# Patient Record
Sex: FEMALE | Race: WHITE | NOT HISPANIC OR LATINO | Employment: FULL TIME | URBAN - METROPOLITAN AREA
[De-identification: names, ages, dates, MRNs, and addresses within clinical notes are randomized per-mention and may not be internally consistent; named-entity substitution may affect disease eponyms.]

---

## 2023-05-04 LAB
ERYTHROCYTE DISTRIBUTION WIDTH (RATIO) BY AUTOMATED COUNT: 12.2 % (ref 11.5–14.5)
ERYTHROCYTE MEAN CORPUSCULAR HEMOGLOBIN CONCENTRATION (G/DL) BY AUTOMATED: 35.1 G/DL (ref 32–36)
ERYTHROCYTE MEAN CORPUSCULAR VOLUME (FL) BY AUTOMATED COUNT: 89 FL (ref 80–100)
ERYTHROCYTES (10*6/UL) IN BLOOD BY AUTOMATED COUNT: 4.21 X10E12/L (ref 4–5.2)
HEMATOCRIT (%) IN BLOOD BY AUTOMATED COUNT: 37.6 % (ref 36–46)
HEMOGLOBIN (G/DL) IN BLOOD: 13.2 G/DL (ref 12–16)
LEUKOCYTES (10*3/UL) IN BLOOD BY AUTOMATED COUNT: 10.4 X10E9/L (ref 4.4–11.3)
PLATELETS (10*3/UL) IN BLOOD AUTOMATED COUNT: 310 X10E9/L (ref 150–450)
REFLEX ADDED, ANEMIA PANEL: NORMAL

## 2023-05-05 LAB
ABO GROUP (TYPE) IN BLOOD: NORMAL
ANTIBODY SCREEN: NORMAL
HEPATITIS B VIRUS SURFACE AG PRESENCE IN SERUM: NONREACTIVE
HEPATITIS C VIRUS AB PRESENCE IN SERUM: NONREACTIVE
HIV 1/ 2 AG/AB SCREEN: NONREACTIVE
RH FACTOR: NORMAL
SYPHILIS TOTAL AB: NONREACTIVE
URINE CULTURE: NORMAL

## 2023-05-09 LAB
RUBELLA VIRUS IGG AB: POSITIVE
VARICELLA ZOSTER IGG: NEGATIVE

## 2023-05-11 LAB — SMA RESULT: NORMAL

## 2023-05-12 LAB — CF RESULTS: NORMAL

## 2023-06-09 LAB — LAB MOLECULAR CA TECHNICAL NOTES: NORMAL

## 2023-09-14 LAB
ERYTHROCYTE DISTRIBUTION WIDTH (RATIO) BY AUTOMATED COUNT: 12.9 % (ref 11.5–14.5)
ERYTHROCYTE MEAN CORPUSCULAR HEMOGLOBIN CONCENTRATION (G/DL) BY AUTOMATED: 35.7 G/DL (ref 32–36)
ERYTHROCYTE MEAN CORPUSCULAR VOLUME (FL) BY AUTOMATED COUNT: 92 FL (ref 80–100)
ERYTHROCYTES (10*6/UL) IN BLOOD BY AUTOMATED COUNT: 3.67 X10E12/L (ref 4–5.2)
GLUCOSE, 1 HR SCREEN, PREG: 145 MG/DL
HEMATOCRIT (%) IN BLOOD BY AUTOMATED COUNT: 33.6 % (ref 36–46)
HEMOGLOBIN (G/DL) IN BLOOD: 12 G/DL (ref 12–16)
LEUKOCYTES (10*3/UL) IN BLOOD BY AUTOMATED COUNT: 10 X10E9/L (ref 4.4–11.3)
PLATELETS (10*3/UL) IN BLOOD AUTOMATED COUNT: 223 X10E9/L (ref 150–450)
REFLEX ADDED, ANEMIA PANEL: ABNORMAL

## 2023-09-15 LAB
ABO GROUP (TYPE) IN BLOOD: NORMAL
ANTIBODY SCREEN: NORMAL
RH FACTOR: NORMAL

## 2023-09-27 LAB
GLUCOSE THREE HOUR: 92 MG/DL
GLUCOSE TWO HOUR: 123 MG/DL
GLUCOSE, FASTING: 70 MG/DL
GLUCOSE, ONE HOUR: 112 MG/DL
GTTCM: NORMAL

## 2023-09-28 PROBLEM — O21.9 NAUSEA/VOMITING IN PREGNANCY (HHS-HCC): Status: ACTIVE | Noted: 2023-09-28

## 2023-09-28 PROBLEM — R73.09 ELEVATED GLUCOSE TOLERANCE TEST: Status: ACTIVE | Noted: 2023-09-28

## 2023-09-28 PROBLEM — Z3A.28 28 WEEKS GESTATION OF PREGNANCY (HHS-HCC): Status: ACTIVE | Noted: 2023-09-28

## 2023-09-28 PROBLEM — O26.899 RH NEGATIVE STATUS DURING PREGNANCY (HHS-HCC): Status: ACTIVE | Noted: 2023-09-28

## 2023-09-28 PROBLEM — Z34.03 PRIMIPAROUS IN THIRD TRIMESTER (HHS-HCC): Status: ACTIVE | Noted: 2023-09-28

## 2023-09-28 PROBLEM — Z67.91 RH NEGATIVE STATUS DURING PREGNANCY (HHS-HCC): Status: ACTIVE | Noted: 2023-09-28

## 2023-09-28 PROBLEM — O99.810 ABNORMAL GLUCOSE AFFECTING PREGNANCY (HHS-HCC): Status: ACTIVE | Noted: 2023-09-28

## 2023-09-28 RX ORDER — PYRIDOXINE HCL (VITAMIN B6) 25 MG
1 TABLET ORAL 3 TIMES DAILY
COMMUNITY
Start: 2023-06-08 | End: 2023-12-28 | Stop reason: WASHOUT

## 2023-10-09 ENCOUNTER — ROUTINE PRENATAL (OUTPATIENT)
Dept: OBSTETRICS AND GYNECOLOGY | Facility: CLINIC | Age: 23
End: 2023-10-09
Payer: COMMERCIAL

## 2023-10-09 VITALS — BODY MASS INDEX: 31.71 KG/M2 | SYSTOLIC BLOOD PRESSURE: 118 MMHG | WEIGHT: 179 LBS | DIASTOLIC BLOOD PRESSURE: 78 MMHG

## 2023-10-09 DIAGNOSIS — O99.810 ABNORMAL GLUCOSE AFFECTING PREGNANCY (HHS-HCC): ICD-10-CM

## 2023-10-09 DIAGNOSIS — Z3A.30 30 WEEKS GESTATION OF PREGNANCY (HHS-HCC): ICD-10-CM

## 2023-10-09 DIAGNOSIS — Z34.03 PRIMIPAROUS IN THIRD TRIMESTER (HHS-HCC): Primary | ICD-10-CM

## 2023-10-09 DIAGNOSIS — Z67.91 RH NEGATIVE STATUS DURING PREGNANCY IN THIRD TRIMESTER (HHS-HCC): ICD-10-CM

## 2023-10-09 DIAGNOSIS — O26.893 RH NEGATIVE STATUS DURING PREGNANCY IN THIRD TRIMESTER (HHS-HCC): ICD-10-CM

## 2023-10-09 PROCEDURE — 99213 OFFICE O/P EST LOW 20 MIN: CPT | Performed by: NURSE PRACTITIONER

## 2023-10-09 NOTE — PROGRESS NOTES
"Subjective   Patient ID [id number]   Ana Morgan is a 23 y.o.  at 30w6d with a working estimated date of delivery of 2023, by Ultrasound who presents for a routine prenatal visit. She denies vaginal bleeding, leakage of fluid, decreased fetal movements, or contractions.    Her pregnancy is complicated by:  Glucose intolerance--> failed 1 hour-->passed 3 hour GTT  Rh negative--> Rhogam given     Objective   Physical Exam:   Weight: 81.2 kg (179 lb)  Expected Total Weight Gain: 7 kg (15 lb)-11.5 kg (25 lb)   Pregravid BMI: 28.70  BP: 118/78  Fetal Heart Rate: 150 Fundal Height (cm): 31 cm Presentation: Vertex           Prenatal Labs  Urine Dip:  No results found for: \"KETONESU\", \"GLUCOSEUR\", \"LEUKOCYTESUR\"  Lab Results   Component Value Date    HGB 12.0 2023    HCT 33.6 (L) 2023    HEPBSAG NONREACTIVE 2023       Imaging  The most recent ultrasound was performed on   with a study GA of   and EFW of  .          Assessment/Plan   Diagnoses and all orders for this visit:  Primiparous in third trimester  Abnormal glucose affecting pregnancy  Rh negative status during pregnancy in third trimester  30 weeks gestation of pregnancy    Continue prenatal vitamin.  Labs reviewed.  GBS @ 36 weeks   Follow up in 1 week for a routine prenatal visit.    The following were addressed during this visit:    Education Trimester 2  - Breastfeeding Education: Second Trimester   -  Labor     Education Trimester 3  - Breastfeeding Education: Third Trimester   - Warning Signs   - Length of Stay   - Warning Signs/PIH        "

## 2023-10-26 ENCOUNTER — ROUTINE PRENATAL (OUTPATIENT)
Dept: OBSTETRICS AND GYNECOLOGY | Facility: CLINIC | Age: 23
End: 2023-10-26
Payer: COMMERCIAL

## 2023-10-26 VITALS — SYSTOLIC BLOOD PRESSURE: 110 MMHG | WEIGHT: 178 LBS | BODY MASS INDEX: 31.53 KG/M2 | DIASTOLIC BLOOD PRESSURE: 80 MMHG

## 2023-10-26 DIAGNOSIS — Z3A.32 32 WEEKS GESTATION OF PREGNANCY (HHS-HCC): ICD-10-CM

## 2023-10-26 DIAGNOSIS — Z34.03 PRIMIPAROUS IN THIRD TRIMESTER (HHS-HCC): Primary | ICD-10-CM

## 2023-10-26 PROCEDURE — 0501F PRENATAL FLOW SHEET: CPT | Performed by: MIDWIFE

## 2023-10-26 NOTE — PROGRESS NOTES
"Subjective   Patient ID [id number]   Ana Morgan is a 23 y.o.  at 33w2d with a working estimated date of delivery of 2023, by Ultrasound who presents for a routine prenatal visit. She denies vaginal bleeding, leakage of fluid, decreased fetal movements, or contractions. They are working on the nursery and mostly ready for the baby. They just had their shower and have everything needed.     Her pregnancy is complicated by:  -Rh negative status, s/p Rhogam 10/9/23  -elevated 50g GTT, passed 3H testing (3/4 values)     Objective   Physical Exam: NAD, easy respiratory effort, CN grossly intact, alert & oriented @ baseline   Weight: 80.7 kg (178 lb)  Expected Total Weight Gain: 7 kg (15 lb)-11.5 kg (25 lb)   Pregravid BMI: 28.70  BP: 110/80                  Prenatal Labs  Urine Dip:  No results found for: \"KETONESU\", \"GLUCOSEUR\", \"LEUKOCYTESUR\"  Lab Results   Component Value Date    HGB 12.0 2023    HCT 33.6 (L) 2023    ABO O 2023    HEPBSAG NONREACTIVE 2023     No results found for: \"PAPPA\", \"AFP\", \"HCG\", \"ESTRIOL\", \"INHBA\"  No results found for: \"GLUF\", \"GLUT1\", \"IFDJPUB0QE\", \"KITZCSN3GC\"    Imaging  The most recent ultrasound was for anatomy @ 20wga. .          Assessment/Plan   There are no diagnoses linked to this encounter.  Continue prenatal vitamin.  Labs reviewed and up to date.  Anticipate GBS @ 36w.  Expected mode of delivery   Follow up in 2 weeks for a routine prenatal visit.    SOL GRAHAM   "

## 2023-10-27 PROBLEM — O21.9 NAUSEA/VOMITING IN PREGNANCY (HHS-HCC): Status: RESOLVED | Noted: 2023-09-28 | Resolved: 2023-10-27

## 2023-10-27 PROBLEM — Z3A.28 28 WEEKS GESTATION OF PREGNANCY (HHS-HCC): Status: RESOLVED | Noted: 2023-09-28 | Resolved: 2023-10-27

## 2023-10-27 PROBLEM — O99.810 ABNORMAL GLUCOSE AFFECTING PREGNANCY (HHS-HCC): Status: RESOLVED | Noted: 2023-09-28 | Resolved: 2023-10-27

## 2023-11-06 ENCOUNTER — ROUTINE PRENATAL (OUTPATIENT)
Dept: OBSTETRICS AND GYNECOLOGY | Facility: CLINIC | Age: 23
End: 2023-11-06
Payer: COMMERCIAL

## 2023-11-06 VITALS — WEIGHT: 179 LBS | SYSTOLIC BLOOD PRESSURE: 120 MMHG | DIASTOLIC BLOOD PRESSURE: 80 MMHG | BODY MASS INDEX: 31.71 KG/M2

## 2023-11-06 DIAGNOSIS — Z67.91 RH NEGATIVE STATUS DURING PREGNANCY IN THIRD TRIMESTER (HHS-HCC): ICD-10-CM

## 2023-11-06 DIAGNOSIS — R73.09 ELEVATED GLUCOSE TOLERANCE TEST: ICD-10-CM

## 2023-11-06 DIAGNOSIS — Z34.03 PRIMIPAROUS IN THIRD TRIMESTER (HHS-HCC): Primary | ICD-10-CM

## 2023-11-06 DIAGNOSIS — O26.893 RH NEGATIVE STATUS DURING PREGNANCY IN THIRD TRIMESTER (HHS-HCC): ICD-10-CM

## 2023-11-06 DIAGNOSIS — Z3A.34 34 WEEKS GESTATION OF PREGNANCY (HHS-HCC): ICD-10-CM

## 2023-11-06 PROCEDURE — 0501F PRENATAL FLOW SHEET: CPT | Performed by: NURSE PRACTITIONER

## 2023-11-06 NOTE — PROGRESS NOTES
"Subjective   Patient ID [id number]   Ana Morgan is a 23 y.o.  at 34w6d with a working estimated date of delivery of 2023, by Ultrasound who presents for a routine prenatal visit. She denies vaginal bleeding, leakage of fluid, decreased fetal movements, or contractions.    Her pregnancy is complicated by:  Pregnancy c/b:   -Rh negative status, s/p Rhogam 10/9/23   -elevated 50g GTT, passed 3H testing (3/4 values)       Objective   Physical Exam:   Weight: 81.2 kg (179 lb)  Expected Total Weight Gain: 7 kg (15 lb)-11.5 kg (25 lb)   Pregravid BMI: 28.70  BP: 120/80  Fetal Heart Rate: 140 Fundal Height (cm): 34 cm Presentation: Vertex           Prenatal Labs  Urine Dip:  No results found for: \"KETONESU\", \"GLUCOSEUR\", \"LEUKOCYTESUR\"  Lab Results   Component Value Date    HGB 12.0 2023    HCT 33.6 (L) 2023    ABO O 2023    HEPBSAG NONREACTIVE 2023       Imaging  The most recent ultrasound was performed on   with a study GA of   and EFW of  .      .    Assessment/Plan   Diagnoses and all orders for this visit:  Primiparous in third trimester  Rh negative status during pregnancy in third trimester  Elevated glucose tolerance test  34 weeks gestation of pregnancy    Continue prenatal vitamin.  Labs reviewed.  GBS after 36 wga.  Expected mode of delivery   Follow up in 1 week for a routine prenatal visit.  "

## 2023-11-13 ENCOUNTER — ROUTINE PRENATAL (OUTPATIENT)
Dept: OBSTETRICS AND GYNECOLOGY | Facility: CLINIC | Age: 23
End: 2023-11-13
Payer: COMMERCIAL

## 2023-11-13 VITALS — DIASTOLIC BLOOD PRESSURE: 70 MMHG | WEIGHT: 184 LBS | SYSTOLIC BLOOD PRESSURE: 122 MMHG | BODY MASS INDEX: 32.59 KG/M2

## 2023-11-13 DIAGNOSIS — R73.09 ELEVATED GLUCOSE TOLERANCE TEST: ICD-10-CM

## 2023-11-13 DIAGNOSIS — Z34.03 PRIMIPAROUS IN THIRD TRIMESTER (HHS-HCC): ICD-10-CM

## 2023-11-13 DIAGNOSIS — Z67.91 RH NEGATIVE STATUS DURING PREGNANCY IN THIRD TRIMESTER (HHS-HCC): Primary | ICD-10-CM

## 2023-11-13 DIAGNOSIS — Z3A.35 35 WEEKS GESTATION OF PREGNANCY (HHS-HCC): ICD-10-CM

## 2023-11-13 DIAGNOSIS — O26.893 RH NEGATIVE STATUS DURING PREGNANCY IN THIRD TRIMESTER (HHS-HCC): Primary | ICD-10-CM

## 2023-11-13 PROCEDURE — 0501F PRENATAL FLOW SHEET: CPT | Performed by: NURSE PRACTITIONER

## 2023-11-13 NOTE — PROGRESS NOTES
"Subjective   Patient ID [id number]   Ana Morgan is a 23 y.o.  at 35w6d with a working estimated date of delivery of 2023, by Ultrasound who presents for a routine prenatal visit. She denies vaginal bleeding, leakage of fluid, decreased fetal movements, or contractions.    Her pregnancy is complicated by:  Pregnancy c/b:   -Rh negative status, s/p Rhogam 10/9/23   -elevated 50g GTT, passed 3H testing (3/4 values)             Objective   Physical Exam:   Weight: 83.5 kg (184 lb)  Expected Total Weight Gain: 7 kg (15 lb)-11.5 kg (25 lb)   Pregravid BMI: 28.70  BP: 122/70  Fetal Heart Rate: 150 Fundal Height (cm): 36 cm Presentation: Vertex           Prenatal Labs  Urine Dip:  No results found for: \"KETONESU\", \"GLUCOSEUR\", \"LEUKOCYTESUR\"  Lab Results   Component Value Date    HGB 12.0 2023    HCT 33.6 (L) 2023    ABO O 2023    HEPBSAG NONREACTIVE 2023       Imaging  The most recent ultrasound was performed on   with a study GA of   and EFW of  .          Assessment/Plan   Diagnoses and all orders for this visit:  Rh negative status during pregnancy in third trimester  Elevated glucose tolerance test  Primiparous in third trimester  35 weeks gestation of pregnancy    Continue prenatal vitamin.  Labs reviewed.  GBS next week   Follow up in 1 week for a routine prenatal visit.  "

## 2023-11-20 ENCOUNTER — ROUTINE PRENATAL (OUTPATIENT)
Dept: OBSTETRICS AND GYNECOLOGY | Facility: CLINIC | Age: 23
End: 2023-11-20
Payer: COMMERCIAL

## 2023-11-20 VITALS — SYSTOLIC BLOOD PRESSURE: 116 MMHG | BODY MASS INDEX: 32.95 KG/M2 | DIASTOLIC BLOOD PRESSURE: 70 MMHG | WEIGHT: 186 LBS

## 2023-11-20 DIAGNOSIS — Z67.91 RH NEGATIVE STATUS DURING PREGNANCY IN THIRD TRIMESTER (HHS-HCC): ICD-10-CM

## 2023-11-20 DIAGNOSIS — O26.893 RH NEGATIVE STATUS DURING PREGNANCY IN THIRD TRIMESTER (HHS-HCC): ICD-10-CM

## 2023-11-20 DIAGNOSIS — Z3A.36 36 WEEKS GESTATION OF PREGNANCY (HHS-HCC): ICD-10-CM

## 2023-11-20 DIAGNOSIS — R73.09 ELEVATED GLUCOSE TOLERANCE TEST: ICD-10-CM

## 2023-11-20 DIAGNOSIS — Z34.03 PRIMIPAROUS IN THIRD TRIMESTER (HHS-HCC): Primary | ICD-10-CM

## 2023-11-20 PROCEDURE — 99213 OFFICE O/P EST LOW 20 MIN: CPT | Performed by: NURSE PRACTITIONER

## 2023-11-20 PROCEDURE — 87081 CULTURE SCREEN ONLY: CPT

## 2023-11-20 NOTE — PROGRESS NOTES
"Subjective   Patient ID [id number]   Ana Morgan is a 23 y.o.  at 36w6d with a working estimated date of delivery of 2023, by Ultrasound who presents for a routine prenatal visit. She denies vaginal bleeding, leakage of fluid, decreased fetal movements, or contractions.    Pregnancy c/b:   -Rh negative status, s/p Rhogam 10/9/23   -elevated 50g GTT, passed 3H testing (3/4 values)     Objective   Physical Exam:   Weight: 84.4 kg (186 lb)  Expected Total Weight Gain: 7 kg (15 lb)-11.5 kg (25 lb)   Pregravid BMI: 28.70  BP: 116/70  Fetal Heart Rate: 142 Fundal Height (cm): 37 cm Presentation: Vertex           Prenatal Labs  Urine Dip:  No results found for: \"KETONESU\", \"GLUCOSEUR\", \"LEUKOCYTESUR\"  Lab Results   Component Value Date    HGB 12.0 2023    HCT 33.6 (L) 2023    ABO O 2023    HEPBSAG NONREACTIVE 2023               Assessment/Plan   There are no diagnoses linked to this encounter.  Continue prenatal vitamin.  Labs reviewed.  GBS taken.  Expected mode of delivery   Birth plan previously reviewed   Getting ready for baby at home   Follow up in 1 week for a routine prenatal visit.  "

## 2023-11-23 LAB — GP B STREP GENITAL QL CULT: NORMAL

## 2023-11-27 ENCOUNTER — ROUTINE PRENATAL (OUTPATIENT)
Dept: OBSTETRICS AND GYNECOLOGY | Facility: CLINIC | Age: 23
End: 2023-11-27
Payer: COMMERCIAL

## 2023-11-27 VITALS — DIASTOLIC BLOOD PRESSURE: 80 MMHG | WEIGHT: 190 LBS | BODY MASS INDEX: 33.66 KG/M2 | SYSTOLIC BLOOD PRESSURE: 122 MMHG

## 2023-11-27 DIAGNOSIS — R73.09 ELEVATED GLUCOSE TOLERANCE TEST: ICD-10-CM

## 2023-11-27 DIAGNOSIS — O26.893 RH NEGATIVE STATUS DURING PREGNANCY IN THIRD TRIMESTER (HHS-HCC): ICD-10-CM

## 2023-11-27 DIAGNOSIS — Z67.91 RH NEGATIVE STATUS DURING PREGNANCY IN THIRD TRIMESTER (HHS-HCC): ICD-10-CM

## 2023-11-27 DIAGNOSIS — Z34.03 PRIMIPAROUS IN THIRD TRIMESTER (HHS-HCC): Primary | ICD-10-CM

## 2023-11-27 PROCEDURE — 0501F PRENATAL FLOW SHEET: CPT | Performed by: NURSE PRACTITIONER

## 2023-11-27 NOTE — PROGRESS NOTES
Subjective   Patient ID [id number]   Ana Morgan is a 23 y.o.  at 37w6d with a working estimated date of delivery of 2023, by Ultrasound who presents for a routine prenatal visit. She denies vaginal bleeding, leakage of fluid, decreased fetal movements, or contractions.    Pregnancy c/b:   -Rh negative status, s/p Rhogam 10/9/23   -elevated 50g GTT, passed 3H testing (3/4 values)     Objective   Physical Exam:   Weight: 86.2 kg (190 lb)  Expected Total Weight Gain: 7 kg (15 lb)-11.5 kg (25 lb)   Pregravid BMI: 28.70  BP: 122/80  Fetal Heart Rate: 140 Fundal Height (cm): 37 cm Presentation: Vertex           Prenatal Labs    Lab Results   Component Value Date    HGB 12.0 2023    HCT 33.6 (L) 2023    ABO O 2023    HEPBSAG NONREACTIVE 2023          Assessment/Plan   Diagnoses and all orders for this visit:  Primiparous in third trimester  Elevated glucose tolerance test  Rh negative status during pregnancy in third trimester    Continue prenatal vitamin.  Labs reviewed.  GBS negative   Expected mode of delivery   Follow up in 1 week for a routine prenatal visit.    Yadira Suarez, APRN-CNM, APRN-CNP

## 2023-12-04 ENCOUNTER — ROUTINE PRENATAL (OUTPATIENT)
Dept: OBSTETRICS AND GYNECOLOGY | Facility: CLINIC | Age: 23
End: 2023-12-04
Payer: COMMERCIAL

## 2023-12-04 VITALS — DIASTOLIC BLOOD PRESSURE: 80 MMHG | WEIGHT: 188 LBS | SYSTOLIC BLOOD PRESSURE: 120 MMHG | BODY MASS INDEX: 33.3 KG/M2

## 2023-12-04 DIAGNOSIS — Z34.03 PRIMIPAROUS IN THIRD TRIMESTER (HHS-HCC): Primary | ICD-10-CM

## 2023-12-04 DIAGNOSIS — Z3A.38 38 WEEKS GESTATION OF PREGNANCY (HHS-HCC): ICD-10-CM

## 2023-12-04 DIAGNOSIS — O26.893 RH NEGATIVE STATUS DURING PREGNANCY IN THIRD TRIMESTER (HHS-HCC): ICD-10-CM

## 2023-12-04 DIAGNOSIS — Z67.91 RH NEGATIVE STATUS DURING PREGNANCY IN THIRD TRIMESTER (HHS-HCC): ICD-10-CM

## 2023-12-04 LAB
POC GLUCOSE, URINE: NEGATIVE MG/DL
POC PROTEIN, URINE: NEGATIVE MG/DL

## 2023-12-04 PROCEDURE — 0501F PRENATAL FLOW SHEET: CPT | Performed by: NURSE PRACTITIONER

## 2023-12-04 NOTE — PROGRESS NOTES
Subjective   Patient ID [id number]   Ana Morgan is a 23 y.o.  at 38w6d with a working estimated date of delivery of 2023, by Ultrasound who presents for a routine prenatal visit. She denies vaginal bleeding, leakage of fluid, decreased fetal movements, or contractions.    Pregnancy c/b:   -Rh negative status, s/p Rhogam 10/9/23   -elevated 50g GTT, passed 3H testing (3/4 values)     Objective   Physical Exam:   Weight: 85.3 kg (188 lb)  Expected Total Weight Gain: 7 kg (15 lb)-11.5 kg (25 lb)   Pregravid BMI: 28.70  BP: 120/80  Fetal Heart Rate: 140 Fundal Height (cm): 38 cm Presentation: Vertex  Dilation: Fingertip Effacement (%): 50 Fetal Station: -3    Lab Results   Component Value Date    HGB 12.0 2023    HCT 33.6 (L) 2023    ABO O 2023    HEPBSAG NONREACTIVE 2023       Assessment/Plan   Diagnoses and all orders for this visit:  Primiparous in third trimester  38 weeks gestation of pregnancy  -     POC Urine Dip  Rh negative status during pregnancy in third trimester    Continue prenatal vitamin.  Labs reviewed.  GBS reviewed negative  Expected mode of delivery , pt desires to discuss R/B of IOL.   Labor warning signs of concern reviewed with pt.   Keep OBV in 1 week, followed by consideration of IOL at 40-41 wga per pt request.   Follow up in 1 week for a routine prenatal visit.    Yadira Suarez, APRN-CNM, APRN-CNP

## 2023-12-12 ENCOUNTER — ROUTINE PRENATAL (OUTPATIENT)
Dept: OBSTETRICS AND GYNECOLOGY | Facility: CLINIC | Age: 23
End: 2023-12-12
Payer: COMMERCIAL

## 2023-12-12 VITALS — BODY MASS INDEX: 33.83 KG/M2 | SYSTOLIC BLOOD PRESSURE: 128 MMHG | WEIGHT: 191 LBS | DIASTOLIC BLOOD PRESSURE: 88 MMHG

## 2023-12-12 DIAGNOSIS — Z3A.40 40 WEEKS GESTATION OF PREGNANCY (HHS-HCC): ICD-10-CM

## 2023-12-12 LAB
POC GLUCOSE, URINE: NEGATIVE MG/DL
POC PROTEIN, URINE: NEGATIVE MG/DL

## 2023-12-12 PROCEDURE — 59426 ANTEPARTUM CARE ONLY: CPT | Performed by: NURSE PRACTITIONER

## 2023-12-12 NOTE — PROGRESS NOTES
"Subjective   Patient ID [id number]   Ana Morgan is a 23 y.o.  at 40w0d with a working estimated date of delivery of 2023, by Ultrasound who presents for a routine prenatal visit. She denies vaginal bleeding, leakage of fluid, decreased fetal movements, or contractions.    Pregnancy c/b:   -Rh negative status, s/p Rhogam 10/9/23   -elevated 50g GTT, passed 3H testing (3/4 values)     Objective   Physical Exam:   Weight: 86.6 kg (191 lb)  Expected Total Weight Gain: 7 kg (15 lb)-11.5 kg (25 lb)   Pregravid BMI: 28.70  BP: 128/88  Fetal Heart Rate: 120 Fundal Height (cm): 38 cm Presentation: Vertex  Dilation: 1 Effacement (%): 80 Fetal Station: -2    Prenatal Labs  Urine Dip:  No results found for: \"KETONESU\", \"GLUCOSEUR\", \"LEUKOCYTESUR\"  Lab Results   Component Value Date    HGB 12.0 2023    HCT 33.6 (L) 2023    ABO O 2023    HEPBSAG NONREACTIVE 2023            Assessment/Plan   Diagnoses and all orders for this visit:  40 weeks gestation of pregnancy  -     POC Urine Dip    Continue prenatal vitamin.  Labs reviewed.  GBS taken.  Membranes stripped pt tolerated   Follow up in 1 week for a routine prenatal visit.  "

## 2023-12-12 NOTE — PATIENT INSTRUCTIONS
Methods of Labor Induction  An induction of labor can include a combination of any of the following methods:  _______________________________________________________________________________________________________________________________  Cervical Ripening   Ripening is the process of softening, thinning (effacing), and opening (dilating) the cervix. Your provider will use either medicine or mechanical techniques, or a combination of both, during this phase.     Medicine:  The most commonly used medication is Cytotec® (misoprostol). Your provider will insert it as a tablet into your vagina. This can be done every 3 hours until the cervix is about 3-4 cm dilated.    Prostaglandin (hormone) that softens and thins the cervix, may cause contractions  Has been used for approximately 25 years for labor induction  Cannot be used if you have had a prior  or surgery on your uterus  Can sometimes cause excessive uterine contractions (a medication called Terbutaline can be used to slow down the contractions)        Mechanical - Cervical Ripening Balloon:   A cervical ripening balloon is a device that causes the cervix to release natural prostaglandin hormones   A thin, flexible catheter is placed through the cervix using either a speculum or a digital exam  A small balloon at the end of the device is filled with water - this puts gentle, constant pressure on the cervix causing the release nature prostaglandins that help ripen the cervix.  As your cervix is opening, eventually the balloon will fall out; or the balloon can be deflated and removed if still in after 12 hours        _______________________________________________________________________________________________________________________________  Pitocin®  Pitocin® is a medication version of oxytocin - a hormone in your body that causes contractions to begin    Given through an IV (into the vein) line in your arm.   Started at a low dose - rate will be  gradually increased every 30 minutes until contractions are occurring about every 2-3 minutes  Contractions help your cervix thin and dilate  Most patients become aware of their contractions about 30 to 60 minutes after starting Pitocin® -  you may feel the contractions as mild, period-like cramping that usually becomes stronger and more frequent as labor progresses     _______________________________________________________________________________________________________________________________  Rupture of Membranes (water breaking)  Rupture of membranes means that the bag containing the amniotic fluid around the baby has broken.     To break your bag of water, your cervix needs to be at least 2-3 cm dilated and baby's head needs to be well applied to your cervix.    Your provider will insert a tiny plastic hook into your vagina during a cervical exam to break your water - you may feel the amniotic fluid running out as the membranes break.   Your contractions are likely to become stronger and closer together, helping your labor progress      _______________________________________________________________________________________________________________________________    What will my induction look like?  If you desire an induction of labor for risk reduction, an appointment will be scheduled on the labor and delivery unit (L&D) for a date and time as early as 39 weeks (1 week prior to your due date).    Date and time of induction is subject to change or may be postponed depending on how busy L&D is or availability of nurse staffing.   Please be sure to eat a healthy meal before you come in for your labor induction unless your care team tells you not to.        First labor and/or cervix = 1 cm or less       Prior vaginal delivery and/or first labor cervix = 2-3 cm          You and your baby will be closely monitored throughout all phases of labor. You are encouraged to change positions and move around out of the  bed. Cervical exam frequency will depend on your individual labor progress. Progress means your cervix continues to dilate and efface and/or the baby is moving lower into your pelvis. The duration of labor depends on how long it takes your cervix to thin and open, and when contractions begin.     If you and your baby are doing well,          the goal is to be patient and make every effort to have a vaginal birth.    If your cervix is dilated less than 6 centimeters, the goal is to allow at least 12 to 18 hours after rupture of membranes and being on Pitocin® to progress into active labor before we consider discussing a . If labor does not progress at this stage, it is called “failed induction of labor.”      If your cervix is dilated 6 centimeters or more, the goal is to have strong, effective contractions and for labor to progress at least every 4 to 6 hours before we consider discussing a . If there are health and safety concerns about you or your baby, your care team may recommend a . If labor progress has stalled at this stage, it is called “arrest of active labor”.     If you have any questions at any time about your labor, please ask. A safe delivery is a team effort and you are a valuable member of the team. Our goal is to include you in decisions about your care.

## 2023-12-13 ENCOUNTER — HOSPITAL ENCOUNTER (INPATIENT)
Facility: HOSPITAL | Age: 23
LOS: 3 days | Discharge: HOME | End: 2023-12-16
Attending: OBSTETRICS & GYNECOLOGY | Admitting: ADVANCED PRACTICE MIDWIFE
Payer: COMMERCIAL

## 2023-12-13 PROBLEM — R73.09 ELEVATED GLUCOSE TOLERANCE TEST: Status: RESOLVED | Noted: 2023-09-28 | Resolved: 2023-12-13

## 2023-12-13 PROBLEM — Z37.9 NORMAL LABOR (HHS-HCC): Status: ACTIVE | Noted: 2023-12-13

## 2023-12-13 PROBLEM — Z3A.40 40 WEEKS GESTATION OF PREGNANCY (HHS-HCC): Status: ACTIVE | Noted: 2023-12-13

## 2023-12-13 PROCEDURE — 99214 OFFICE O/P EST MOD 30 MIN: CPT

## 2023-12-13 PROCEDURE — 1120000001 HC OB PRIVATE ROOM DAILY

## 2023-12-13 RX ORDER — HYDRALAZINE HYDROCHLORIDE 20 MG/ML
5 INJECTION INTRAMUSCULAR; INTRAVENOUS ONCE AS NEEDED
Status: DISCONTINUED | OUTPATIENT
Start: 2023-12-13 | End: 2023-12-14

## 2023-12-13 RX ORDER — OXYTOCIN/0.9 % SODIUM CHLORIDE 30/500 ML
60 PLASTIC BAG, INJECTION (ML) INTRAVENOUS ONCE AS NEEDED
Status: DISCONTINUED | OUTPATIENT
Start: 2023-12-13 | End: 2023-12-14

## 2023-12-13 RX ORDER — LIDOCAINE HYDROCHLORIDE 10 MG/ML
30 INJECTION INFILTRATION; PERINEURAL ONCE AS NEEDED
Status: DISCONTINUED | OUTPATIENT
Start: 2023-12-13 | End: 2023-12-14

## 2023-12-13 RX ORDER — TERBUTALINE SULFATE 1 MG/ML
0.25 INJECTION SUBCUTANEOUS ONCE AS NEEDED
Status: DISCONTINUED | OUTPATIENT
Start: 2023-12-13 | End: 2023-12-14

## 2023-12-13 RX ORDER — NIFEDIPINE 10 MG/1
10 CAPSULE ORAL ONCE AS NEEDED
Status: DISCONTINUED | OUTPATIENT
Start: 2023-12-13 | End: 2023-12-14

## 2023-12-13 RX ORDER — ONDANSETRON 4 MG/1
4 TABLET, FILM COATED ORAL EVERY 6 HOURS PRN
Status: DISCONTINUED | OUTPATIENT
Start: 2023-12-13 | End: 2023-12-14

## 2023-12-13 RX ORDER — METOCLOPRAMIDE HYDROCHLORIDE 5 MG/ML
10 INJECTION INTRAMUSCULAR; INTRAVENOUS EVERY 6 HOURS PRN
Status: DISCONTINUED | OUTPATIENT
Start: 2023-12-13 | End: 2023-12-14

## 2023-12-13 RX ORDER — TRANEXAMIC ACID 100 MG/ML
1000 INJECTION, SOLUTION INTRAVENOUS ONCE AS NEEDED
Status: DISCONTINUED | OUTPATIENT
Start: 2023-12-13 | End: 2023-12-14

## 2023-12-13 RX ORDER — SODIUM CHLORIDE, SODIUM LACTATE, POTASSIUM CHLORIDE, CALCIUM CHLORIDE 600; 310; 30; 20 MG/100ML; MG/100ML; MG/100ML; MG/100ML
125 INJECTION, SOLUTION INTRAVENOUS CONTINUOUS
Status: DISCONTINUED | OUTPATIENT
Start: 2023-12-14 | End: 2023-12-14

## 2023-12-13 RX ORDER — CARBOPROST TROMETHAMINE 250 UG/ML
250 INJECTION, SOLUTION INTRAMUSCULAR ONCE AS NEEDED
Status: DISCONTINUED | OUTPATIENT
Start: 2023-12-13 | End: 2023-12-14

## 2023-12-13 RX ORDER — LOPERAMIDE HYDROCHLORIDE 2 MG/1
4 CAPSULE ORAL EVERY 2 HOUR PRN
Status: DISCONTINUED | OUTPATIENT
Start: 2023-12-13 | End: 2023-12-14

## 2023-12-13 RX ORDER — METHYLERGONOVINE MALEATE 0.2 MG/ML
0.2 INJECTION INTRAVENOUS ONCE AS NEEDED
Status: DISCONTINUED | OUTPATIENT
Start: 2023-12-13 | End: 2023-12-14

## 2023-12-13 RX ORDER — METOCLOPRAMIDE 10 MG/1
10 TABLET ORAL EVERY 6 HOURS PRN
Status: DISCONTINUED | OUTPATIENT
Start: 2023-12-13 | End: 2023-12-14

## 2023-12-13 RX ORDER — OXYTOCIN 10 [USP'U]/ML
10 INJECTION, SOLUTION INTRAMUSCULAR; INTRAVENOUS ONCE AS NEEDED
Status: DISCONTINUED | OUTPATIENT
Start: 2023-12-13 | End: 2023-12-14

## 2023-12-13 RX ORDER — LABETALOL HYDROCHLORIDE 5 MG/ML
20 INJECTION, SOLUTION INTRAVENOUS ONCE AS NEEDED
Status: DISCONTINUED | OUTPATIENT
Start: 2023-12-13 | End: 2023-12-14

## 2023-12-13 RX ORDER — ONDANSETRON HYDROCHLORIDE 2 MG/ML
4 INJECTION, SOLUTION INTRAVENOUS EVERY 6 HOURS PRN
Status: DISCONTINUED | OUTPATIENT
Start: 2023-12-13 | End: 2023-12-14

## 2023-12-13 RX ORDER — MISOPROSTOL 200 UG/1
800 TABLET ORAL ONCE AS NEEDED
Status: DISCONTINUED | OUTPATIENT
Start: 2023-12-13 | End: 2023-12-14

## 2023-12-13 SDOH — SOCIAL STABILITY: SOCIAL INSECURITY: DOES ANYONE TRY TO KEEP YOU FROM HAVING/CONTACTING OTHER FRIENDS OR DOING THINGS OUTSIDE YOUR HOME?: NO

## 2023-12-13 SDOH — HEALTH STABILITY: MENTAL HEALTH: WERE YOU ABLE TO COMPLETE ALL THE BEHAVIORAL HEALTH SCREENINGS?: YES

## 2023-12-13 SDOH — HEALTH STABILITY: MENTAL HEALTH: WISH TO BE DEAD (PAST 1 MONTH): NO

## 2023-12-13 SDOH — HEALTH STABILITY: MENTAL HEALTH: HAVE YOU USED ANY SUBSTANCES (CANABIS, COCAINE, HEROIN, HALLUCINOGENS, INHALANTS, ETC.) IN THE PAST 12 MONTHS?: NO

## 2023-12-13 SDOH — SOCIAL STABILITY: SOCIAL INSECURITY: HAVE YOU HAD THOUGHTS OF HARMING ANYONE ELSE?: NO

## 2023-12-13 SDOH — ECONOMIC STABILITY: HOUSING INSECURITY: DO YOU FEEL UNSAFE GOING BACK TO THE PLACE WHERE YOU ARE LIVING?: NO

## 2023-12-13 SDOH — SOCIAL STABILITY: SOCIAL INSECURITY: PHYSICAL ABUSE: DENIES

## 2023-12-13 SDOH — SOCIAL STABILITY: SOCIAL INSECURITY: VERBAL ABUSE: DENIES

## 2023-12-13 SDOH — HEALTH STABILITY: MENTAL HEALTH: NON-SPECIFIC ACTIVE SUICIDAL THOUGHTS (PAST 1 MONTH): NO

## 2023-12-13 SDOH — HEALTH STABILITY: MENTAL HEALTH: HAVE YOU USED ANY PRESCRIPTION DRUGS OTHER THAN PRESCRIBED IN THE PAST 12 MONTHS?: NO

## 2023-12-13 SDOH — SOCIAL STABILITY: SOCIAL INSECURITY: ARE YOU OR HAVE YOU BEEN THREATENED OR ABUSED PHYSICALLY, EMOTIONALLY, OR SEXUALLY BY ANYONE?: NO

## 2023-12-13 SDOH — HEALTH STABILITY: MENTAL HEALTH: SUICIDAL BEHAVIOR (LIFETIME): NO

## 2023-12-13 SDOH — SOCIAL STABILITY: SOCIAL INSECURITY: ARE THERE ANY APPARENT SIGNS OF INJURIES/BEHAVIORS THAT COULD BE RELATED TO ABUSE/NEGLECT?: NO

## 2023-12-13 SDOH — SOCIAL STABILITY: SOCIAL INSECURITY: DO YOU FEEL ANYONE HAS EXPLOITED OR TAKEN ADVANTAGE OF YOU FINANCIALLY OR OF YOUR PERSONAL PROPERTY?: NO

## 2023-12-13 SDOH — SOCIAL STABILITY: SOCIAL INSECURITY: ABUSE SCREEN: ADULT

## 2023-12-13 SDOH — SOCIAL STABILITY: SOCIAL INSECURITY: HAS ANYONE EVER THREATENED TO HURT YOUR FAMILY OR YOUR PETS?: NO

## 2023-12-13 ASSESSMENT — PATIENT HEALTH QUESTIONNAIRE - PHQ9
SUM OF ALL RESPONSES TO PHQ9 QUESTIONS 1 & 2: 0
1. LITTLE INTEREST OR PLEASURE IN DOING THINGS: NOT AT ALL
2. FEELING DOWN, DEPRESSED OR HOPELESS: NOT AT ALL

## 2023-12-13 ASSESSMENT — LIFESTYLE VARIABLES
SKIP TO QUESTIONS 9-10: 1
HOW OFTEN DO YOU HAVE 6 OR MORE DRINKS ON ONE OCCASION: NEVER
AUDIT-C TOTAL SCORE: 0
AUDIT-C TOTAL SCORE: 0
HOW MANY STANDARD DRINKS CONTAINING ALCOHOL DO YOU HAVE ON A TYPICAL DAY: PATIENT DOES NOT DRINK
HOW OFTEN DO YOU HAVE A DRINK CONTAINING ALCOHOL: NEVER

## 2023-12-14 ENCOUNTER — ANESTHESIA EVENT (OUTPATIENT)
Dept: OBSTETRICS AND GYNECOLOGY | Facility: HOSPITAL | Age: 23
End: 2023-12-14
Payer: COMMERCIAL

## 2023-12-14 ENCOUNTER — ANESTHESIA (OUTPATIENT)
Dept: OBSTETRICS AND GYNECOLOGY | Facility: HOSPITAL | Age: 23
End: 2023-12-14
Payer: COMMERCIAL

## 2023-12-14 PROBLEM — O13.9 GESTATIONAL HYPERTENSION, ANTEPARTUM (HHS-HCC): Status: ACTIVE | Noted: 2023-12-14

## 2023-12-14 LAB
ABO GROUP (TYPE) IN BLOOD: NORMAL
ALBUMIN SERPL BCP-MCNC: 2.8 G/DL (ref 3.4–5)
ALP SERPL-CCNC: 134 U/L (ref 33–110)
ALT SERPL W P-5'-P-CCNC: 11 U/L (ref 7–45)
ANION GAP SERPL CALC-SCNC: 18 MMOL/L (ref 10–20)
ANTIBODY SCREEN: NORMAL
AST SERPL W P-5'-P-CCNC: 24 U/L (ref 9–39)
BB ANTIBODY IDENTIFICATION: NORMAL
BILIRUB SERPL-MCNC: 0.6 MG/DL (ref 0–1.2)
BUN SERPL-MCNC: 12 MG/DL (ref 6–23)
CALCIUM SERPL-MCNC: 8.3 MG/DL (ref 8.6–10.3)
CASE #: NORMAL
CHLORIDE SERPL-SCNC: 104 MMOL/L (ref 98–107)
CO2 SERPL-SCNC: 17 MMOL/L (ref 21–32)
CREAT SERPL-MCNC: 0.82 MG/DL (ref 0.5–1.05)
ERYTHROCYTE [DISTWIDTH] IN BLOOD BY AUTOMATED COUNT: 13.2 % (ref 11.5–14.5)
ERYTHROCYTE [DISTWIDTH] IN BLOOD BY AUTOMATED COUNT: 13.5 % (ref 11.5–14.5)
GFR SERPL CREATININE-BSD FRML MDRD: >90 ML/MIN/1.73M*2
GLUCOSE SERPL-MCNC: 84 MG/DL (ref 74–99)
HCT VFR BLD AUTO: 34.7 % (ref 36–46)
HCT VFR BLD AUTO: 35.3 % (ref 36–46)
HGB BLD-MCNC: 12 G/DL (ref 12–16)
HGB BLD-MCNC: 12.2 G/DL (ref 12–16)
MCH RBC QN AUTO: 31.6 PG (ref 26–34)
MCH RBC QN AUTO: 31.7 PG (ref 26–34)
MCHC RBC AUTO-ENTMCNC: 34 G/DL (ref 32–36)
MCHC RBC AUTO-ENTMCNC: 35.2 G/DL (ref 32–36)
MCV RBC AUTO: 90 FL (ref 80–100)
MCV RBC AUTO: 93 FL (ref 80–100)
NRBC BLD-RTO: 0 /100 WBCS (ref 0–0)
NRBC BLD-RTO: 0 /100 WBCS (ref 0–0)
PLATELET # BLD AUTO: 225 X10*3/UL (ref 150–450)
PLATELET # BLD AUTO: 255 X10*3/UL (ref 150–450)
POTASSIUM SERPL-SCNC: 3.7 MMOL/L (ref 3.5–5.3)
PROT SERPL-MCNC: 5.1 G/DL (ref 6.4–8.2)
RBC # BLD AUTO: 3.79 X10*6/UL (ref 4–5.2)
RBC # BLD AUTO: 3.86 X10*6/UL (ref 4–5.2)
RH FACTOR (ANTIGEN D): NORMAL
SODIUM SERPL-SCNC: 135 MMOL/L (ref 136–145)
T PALLIDUM AB SER QL: NONREACTIVE
WBC # BLD AUTO: 16.8 X10*3/UL (ref 4.4–11.3)
WBC # BLD AUTO: 24.1 X10*3/UL (ref 4.4–11.3)

## 2023-12-14 PROCEDURE — 86780 TREPONEMA PALLIDUM: CPT | Mod: AHULAB | Performed by: ADVANCED PRACTICE MIDWIFE

## 2023-12-14 PROCEDURE — 59409 OBSTETRICAL CARE: CPT | Performed by: ADVANCED PRACTICE MIDWIFE

## 2023-12-14 PROCEDURE — 01967 NEURAXL LBR ANES VAG DLVR: CPT | Performed by: NURSE ANESTHETIST, CERTIFIED REGISTERED

## 2023-12-14 PROCEDURE — 36415 COLL VENOUS BLD VENIPUNCTURE: CPT | Performed by: ADVANCED PRACTICE MIDWIFE

## 2023-12-14 PROCEDURE — 51701 INSERT BLADDER CATHETER: CPT

## 2023-12-14 PROCEDURE — 3700000002 HC GENERAL ANESTHESIA TIME - EACH INCREMENTAL 1 MINUTE: Performed by: NURSE ANESTHETIST, CERTIFIED REGISTERED

## 2023-12-14 PROCEDURE — 3E033VJ INTRODUCTION OF OTHER HORMONE INTO PERIPHERAL VEIN, PERCUTANEOUS APPROACH: ICD-10-PCS | Performed by: ADVANCED PRACTICE MIDWIFE

## 2023-12-14 PROCEDURE — 7100000016 HC LABOR RECOVERY PER HOUR

## 2023-12-14 PROCEDURE — 0KQM0ZZ REPAIR PERINEUM MUSCLE, OPEN APPROACH: ICD-10-PCS | Performed by: ADVANCED PRACTICE MIDWIFE

## 2023-12-14 PROCEDURE — 2500000001 HC RX 250 WO HCPCS SELF ADMINISTERED DRUGS (ALT 637 FOR MEDICARE OP): Performed by: ADVANCED PRACTICE MIDWIFE

## 2023-12-14 PROCEDURE — 85027 COMPLETE CBC AUTOMATED: CPT | Performed by: ADVANCED PRACTICE MIDWIFE

## 2023-12-14 PROCEDURE — 3700000001 HC GENERAL ANESTHESIA TIME - INITIAL BASE CHARGE: Performed by: NURSE ANESTHETIST, CERTIFIED REGISTERED

## 2023-12-14 PROCEDURE — 2720000007 HC OR 272 NO HCPCS

## 2023-12-14 PROCEDURE — 1100000001 HC PRIVATE ROOM DAILY

## 2023-12-14 PROCEDURE — 2500000004 HC RX 250 GENERAL PHARMACY W/ HCPCS (ALT 636 FOR OP/ED): Performed by: ADVANCED PRACTICE MIDWIFE

## 2023-12-14 PROCEDURE — 59050 FETAL MONITOR W/REPORT: CPT

## 2023-12-14 PROCEDURE — 7210000002 HC LABOR PER HOUR

## 2023-12-14 PROCEDURE — 86870 RBC ANTIBODY IDENTIFICATION: CPT

## 2023-12-14 PROCEDURE — 86850 RBC ANTIBODY SCREEN: CPT | Performed by: ADVANCED PRACTICE MIDWIFE

## 2023-12-14 PROCEDURE — 2500000004 HC RX 250 GENERAL PHARMACY W/ HCPCS (ALT 636 FOR OP/ED): Performed by: NURSE ANESTHETIST, CERTIFIED REGISTERED

## 2023-12-14 PROCEDURE — 80053 COMPREHEN METABOLIC PANEL: CPT | Performed by: ADVANCED PRACTICE MIDWIFE

## 2023-12-14 RX ORDER — IBUPROFEN 600 MG/1
600 TABLET ORAL EVERY 6 HOURS SCHEDULED
Status: DISCONTINUED | OUTPATIENT
Start: 2023-12-14 | End: 2023-12-16 | Stop reason: HOSPADM

## 2023-12-14 RX ORDER — MISOPROSTOL 200 UG/1
800 TABLET ORAL ONCE AS NEEDED
Status: DISCONTINUED | OUTPATIENT
Start: 2023-12-14 | End: 2023-12-16 | Stop reason: HOSPADM

## 2023-12-14 RX ORDER — MORPHINE SULFATE 4 MG/ML
2 INJECTION INTRAVENOUS EVERY 4 HOURS PRN
Status: DISCONTINUED | OUTPATIENT
Start: 2023-12-14 | End: 2023-12-14

## 2023-12-14 RX ORDER — ONDANSETRON HYDROCHLORIDE 2 MG/ML
4 INJECTION, SOLUTION INTRAVENOUS EVERY 6 HOURS PRN
Status: DISCONTINUED | OUTPATIENT
Start: 2023-12-14 | End: 2023-12-16 | Stop reason: HOSPADM

## 2023-12-14 RX ORDER — HYDRALAZINE HYDROCHLORIDE 20 MG/ML
5 INJECTION INTRAMUSCULAR; INTRAVENOUS ONCE AS NEEDED
Status: DISCONTINUED | OUTPATIENT
Start: 2023-12-14 | End: 2023-12-16 | Stop reason: HOSPADM

## 2023-12-14 RX ORDER — DIPHENHYDRAMINE HCL 25 MG
25 CAPSULE ORAL EVERY 6 HOURS PRN
Status: DISCONTINUED | OUTPATIENT
Start: 2023-12-14 | End: 2023-12-16 | Stop reason: HOSPADM

## 2023-12-14 RX ORDER — OXYTOCIN/0.9 % SODIUM CHLORIDE 30/500 ML
2-30 PLASTIC BAG, INJECTION (ML) INTRAVENOUS CONTINUOUS
Status: DISCONTINUED | OUTPATIENT
Start: 2023-12-14 | End: 2023-12-14

## 2023-12-14 RX ORDER — SIMETHICONE 80 MG
80 TABLET,CHEWABLE ORAL 4 TIMES DAILY PRN
Status: DISCONTINUED | OUTPATIENT
Start: 2023-12-14 | End: 2023-12-16 | Stop reason: HOSPADM

## 2023-12-14 RX ORDER — ACETAMINOPHEN 325 MG/1
975 TABLET ORAL EVERY 6 HOURS SCHEDULED
Status: DISCONTINUED | OUTPATIENT
Start: 2023-12-14 | End: 2023-12-16 | Stop reason: HOSPADM

## 2023-12-14 RX ORDER — TRANEXAMIC ACID 100 MG/ML
1000 INJECTION, SOLUTION INTRAVENOUS ONCE AS NEEDED
Status: DISCONTINUED | OUTPATIENT
Start: 2023-12-14 | End: 2023-12-16 | Stop reason: HOSPADM

## 2023-12-14 RX ORDER — ADHESIVE BANDAGE
10 BANDAGE TOPICAL
Status: DISCONTINUED | OUTPATIENT
Start: 2023-12-14 | End: 2023-12-16 | Stop reason: HOSPADM

## 2023-12-14 RX ORDER — ONDANSETRON 4 MG/1
4 TABLET, FILM COATED ORAL EVERY 6 HOURS PRN
Status: DISCONTINUED | OUTPATIENT
Start: 2023-12-14 | End: 2023-12-16 | Stop reason: HOSPADM

## 2023-12-14 RX ORDER — LABETALOL HYDROCHLORIDE 5 MG/ML
20 INJECTION, SOLUTION INTRAVENOUS ONCE AS NEEDED
Status: DISCONTINUED | OUTPATIENT
Start: 2023-12-14 | End: 2023-12-16 | Stop reason: HOSPADM

## 2023-12-14 RX ORDER — LOPERAMIDE HYDROCHLORIDE 2 MG/1
4 CAPSULE ORAL EVERY 2 HOUR PRN
Status: DISCONTINUED | OUTPATIENT
Start: 2023-12-14 | End: 2023-12-16 | Stop reason: HOSPADM

## 2023-12-14 RX ORDER — NIFEDIPINE 10 MG/1
10 CAPSULE ORAL ONCE AS NEEDED
Status: DISCONTINUED | OUTPATIENT
Start: 2023-12-14 | End: 2023-12-16 | Stop reason: HOSPADM

## 2023-12-14 RX ORDER — DIPHENHYDRAMINE HYDROCHLORIDE 50 MG/ML
25 INJECTION INTRAMUSCULAR; INTRAVENOUS EVERY 6 HOURS PRN
Status: DISCONTINUED | OUTPATIENT
Start: 2023-12-14 | End: 2023-12-16 | Stop reason: HOSPADM

## 2023-12-14 RX ORDER — FENTANYL/ROPIVACAINE/NS/PF 2MCG/ML-.2
0-25 PLASTIC BAG, INJECTION (ML) INJECTION CONTINUOUS
Status: DISCONTINUED | OUTPATIENT
Start: 2023-12-14 | End: 2023-12-14

## 2023-12-14 RX ORDER — OXYTOCIN 10 [USP'U]/ML
10 INJECTION, SOLUTION INTRAMUSCULAR; INTRAVENOUS ONCE AS NEEDED
Status: DISCONTINUED | OUTPATIENT
Start: 2023-12-14 | End: 2023-12-16 | Stop reason: HOSPADM

## 2023-12-14 RX ORDER — CARBOPROST TROMETHAMINE 250 UG/ML
250 INJECTION, SOLUTION INTRAMUSCULAR ONCE AS NEEDED
Status: DISCONTINUED | OUTPATIENT
Start: 2023-12-14 | End: 2023-12-16 | Stop reason: HOSPADM

## 2023-12-14 RX ORDER — BISACODYL 10 MG/1
10 SUPPOSITORY RECTAL DAILY PRN
Status: DISCONTINUED | OUTPATIENT
Start: 2023-12-14 | End: 2023-12-16 | Stop reason: HOSPADM

## 2023-12-14 RX ORDER — POLYETHYLENE GLYCOL 3350 17 G/17G
17 POWDER, FOR SOLUTION ORAL 2 TIMES DAILY PRN
Status: DISCONTINUED | OUTPATIENT
Start: 2023-12-14 | End: 2023-12-16 | Stop reason: HOSPADM

## 2023-12-14 RX ORDER — METHYLERGONOVINE MALEATE 0.2 MG/ML
0.2 INJECTION INTRAVENOUS ONCE AS NEEDED
Status: DISCONTINUED | OUTPATIENT
Start: 2023-12-14 | End: 2023-12-16 | Stop reason: HOSPADM

## 2023-12-14 RX ORDER — LIDOCAINE 560 MG/1
1 PATCH PERCUTANEOUS; TOPICAL; TRANSDERMAL
Status: DISCONTINUED | OUTPATIENT
Start: 2023-12-14 | End: 2023-12-16 | Stop reason: HOSPADM

## 2023-12-14 RX ADMIN — IBUPROFEN 600 MG: 600 TABLET ORAL at 17:52

## 2023-12-14 RX ADMIN — Medication 2 MILLI-UNITS/MIN: at 06:35

## 2023-12-14 RX ADMIN — ACETAMINOPHEN 975 MG: 325 TABLET ORAL at 17:53

## 2023-12-14 RX ADMIN — MORPHINE SULFATE 2 MG: 4 INJECTION INTRAVENOUS at 01:08

## 2023-12-14 RX ADMIN — IBUPROFEN 600 MG: 600 TABLET ORAL at 23:27

## 2023-12-14 RX ADMIN — Medication 10 ML/HR: at 02:01

## 2023-12-14 RX ADMIN — ACETAMINOPHEN 975 MG: 325 TABLET ORAL at 23:26

## 2023-12-14 RX ADMIN — Medication 10 ML/HR: at 09:43

## 2023-12-14 RX ADMIN — Medication 10 ML: at 02:00

## 2023-12-14 RX ADMIN — SODIUM CHLORIDE, POTASSIUM CHLORIDE, SODIUM LACTATE AND CALCIUM CHLORIDE 125 ML/HR: 600; 310; 30; 20 INJECTION, SOLUTION INTRAVENOUS at 12:16

## 2023-12-14 RX ADMIN — SODIUM CHLORIDE, POTASSIUM CHLORIDE, SODIUM LACTATE AND CALCIUM CHLORIDE 125 ML/HR: 600; 310; 30; 20 INJECTION, SOLUTION INTRAVENOUS at 00:57

## 2023-12-14 SDOH — HEALTH STABILITY: MENTAL HEALTH: CURRENT SMOKER: 0

## 2023-12-14 ASSESSMENT — PAIN DESCRIPTION - LOCATION: LOCATION: BACK

## 2023-12-14 ASSESSMENT — PAIN SCALES - GENERAL
PAINLEVEL_OUTOF10: 0 - NO PAIN
PAINLEVEL_OUTOF10: 2
PAINLEVEL_OUTOF10: 5 - MODERATE PAIN
PAINLEVEL_OUTOF10: 0 - NO PAIN
PAIN_LEVEL: 0
PAINLEVEL_OUTOF10: 0 - NO PAIN
PAINLEVEL_OUTOF10: 2
PAINLEVEL_OUTOF10: 0 - NO PAIN
PAINLEVEL_OUTOF10: 5 - MODERATE PAIN
PAINLEVEL_OUTOF10: 0 - NO PAIN

## 2023-12-14 ASSESSMENT — PAIN SCALES - WONG BAKER: WONGBAKER_NUMERICALRESPONSE: HURTS WHOLE LOT

## 2023-12-14 ASSESSMENT — PAIN - FUNCTIONAL ASSESSMENT: PAIN_FUNCTIONAL_ASSESSMENT: WONG-BAKER FACES

## 2023-12-14 NOTE — PROGRESS NOTES
S: Pt is comfortable with epidural in place  Family at bedside.    O: Cervical Exam: /0  Presentation: vtx  FHR      Baseline: 130s     Variability: moderate     Accels: present     Decels: occasional variable      Category: II  TOCO: contractions every 8-10 min  Membrane status SROM     Color of fluid: Clear    A:  IUP @ 40.2  GBS neg  Reassuring CAT II tracing  Labor phase: latent    P:  Discussed augmentation with pitocin with patient; she would like more time before considering this  Pt placed in more upright position  Consider pitocin as needed if continued stalled progress   Dr. Sosa updated on patient status  Anticipate       Wendi Agee, GABE-LEÓN

## 2023-12-14 NOTE — PROGRESS NOTES
CNM update--patient comfortable; FHR Category I; [place]: q2-4'; cervix  with good progress noted; will continue present management and anticipate second stage and . Dr Starr updated on status.

## 2023-12-14 NOTE — L&D DELIVERY NOTE
OB Delivery Note  2023  Ana Morgan  23 y.o.   Vaginal, Spontaneous        Gestational Age: 40w2d  /Para:   Quantitative Blood Loss: Admission to Discharge: 315 mL (2023 10:24 PM - 2023  5:25 PM)    Reginald Morgan [[id number]]      Labor Events     labor?: No  Sac identifier: Sac 1  Rupture date/time: 2023  Rupture type: Spontaneous  Fluid color: Clear  Fluid odor: None  Labor type: Induced Onset of Labor  Labor allowed to proceed with plans for an attempted vaginal birth?: Yes  Induction: Oxytocin  Induction date/time: 2023 0630  Complications: None       Labor Event Times    Labor onset date/time: 2023  Dilation complete date/time: 2023  Start pushing date/time: 2023 1505       Labor Length    2nd stage: 0h 37m  3rd stage: 0h 04m       Placenta    Placenta delivery date/time: 2023  Placenta removal: Spontaneous  Placenta appearance: Intact  Placenta disposition: discarded       Cord    Vessels: 3 vessels  Complications: None  Delayed cord clamping?: Yes  Cord clamped date/time: 2023  Cord blood disposition: Refrigerator, Lab  Gases sent?: No  Stem cell collection (by provider): No       Lacerations    Episiotomy: None  Perineal laceration: 2nd  Perineal laceration repaired?: Yes  Other lacerations?: No  Repair suture: 2-0 Synthetic Suture       Anesthesia    Method: Epidural       Operative Delivery    Forceps attempted?: No  Vacuum extractor attempted?: No       Shoulder Dystocia    Shoulder dystocia present?: No       Stacy Delivery    Time head delivered: 2023 15:22:00  Birth date/time: 2023 15:22:00  Delivery type: Vaginal, Spontaneous  Complications: None       Resuscitation    Method: None       Apgars    Living status: Living  Apgar Component Scores:  1 min.:  5 min.:  10 min.:  15 min.:  20 min.:    Skin color:  1  1       Heart rate:  2  2       Reflex irritability:  2  2        Muscle tone:  2  2       Respiratory effort:  2  2       Total:  9  9       Apgars assigned by: SHANICE LAZARO       Delivery Providers    Delivering clinician: SANTOS Hermosillo   Provider Role    Shara Bedoya, RN Delivery Nurse    Nikki Lazaro, RN Nursery Nurse             LEÓN delivery note--quick progress in second stage; spontaneous vaginal birth of living baby boy who breathed and cried immediately and is handed to kangaroo care; pitocin bolus initiated for third stage; placenta spontaneous and apparently intact; fundus firm with massage; 2nd degree perineal laceration sustained and repaired with 2-0 vicryl in standard fashion; QBL 200cc; ice pack to perineum.    SANTOS Hermosillo

## 2023-12-14 NOTE — PROGRESS NOTES
CNM update--Patient noted to have moderate variable deceleration in the setting of moderate variability; cervix 6/80/-1 on my exam; temp noted to be 37.8C; Dr Starr updated on status; will continue close observation.

## 2023-12-14 NOTE — PROGRESS NOTES
S: Pt is breathing through contractions and complains of back pain.  Declines medical pain management at this time. Would like to try tub. Prefers to avoid epidural if possible; may consider nitrous oxide. Family at bedside.     O: Cervical Exam: /0  Presentation: vtx  FHR     Baseline: 130s per intermittent auscultation     Variability: mod     Accels: audible on intermittent auscultation     Decels: absent     Category: CAT I per IA  TOCO: contractions every 2-4 minutes of strong intensity  Membrane status: SROM      Color of fluid: Clear fluid    A:  IUP @ 40.2  GBS neg  Category 1 tracing  Labor phase: active    P:  Pt to get in tub now  Intermittent auscultation per protocol  Continue labor support  Pain control as needed  Anticipate   Dr. Sosa updated on patient status     GABE Roper-LEÓN

## 2023-12-14 NOTE — ANESTHESIA PREPROCEDURE EVALUATION
Patient: Ana Morgan    Evaluation Method: In-person visit    Procedure Information    Date: 12/14/23  Procedure: Labor Analgesia         Relevant Problems   No relevant active problems       Clinical information reviewed:    Allergies  Meds  Problems              NPO Detail:  No data recorded     OB/Gyn Evaluation    Present Pregnancy    Patient is pregnant now.   Obstetric History                Physical Exam    Airway  Mallampati: II  TM distance: <3 FB  Neck ROM: full     Cardiovascular - normal exam     Dental    Pulmonary - normal exam     Abdominal            Anesthesia Plan    ASA 2     epidural   (I informed and discussed the risks and benefits of general, spinal and epidural anesthesia with the patient.  The patient expressed her understanding and her questions were answered.  A verbal consent was given by the patient.   )  The patient is not a current smoker.  Patient was not previously instructed to abstain from smoking on day of procedure.  Patient did not smoke on day of procedure.    Anesthetic plan and risks discussed with patient.  Use of blood products discussed with patient who consented to blood products.    Plan discussed with CRNA.

## 2023-12-14 NOTE — PROGRESS NOTES
CNM update--Patient comfortable with epidural--feels some intermittent pressure; FHR Category I with multiple accelerations but baseline rising; maternal temp 37.7C now and tissue warm to touch with vaginal exam; maternal pulse has been 80's throughout this shift and has just, in last few minutes, risen to 100; cervix anterior lip/0 station; will move to throne position and anticipate second stage; Dr Starr updated on status and temp elevation; anticipate .

## 2023-12-14 NOTE — H&P
Obstetrical Admission History and Physical     Ana Morgan is a 23 y.o.  at 40w1d. JOB: 2023, by Ultrasound. Estimated fetal weight: 7.5 lbs. She has had prenatal care with SANTOS Recio. She reports contractions since 1700 today which have become more intense and large gush of clear fluid at 2200. Good FM reported; no bleeding.     Pregnancy significant for:   Elevated 1HR GTT with normal 3HRGTT  O neg - Rhogam on 10/9/23  GBS neg    Assessment    Ana Morgan is a 23 y.o.  at 40w1d. JOB: 2023, by Ultrasound.   FHT Category 1  Latent labor  GBS neg   Rh neg; rhogam on 10/9/23    Plan    Labor Admission Plan: Admit to Labor & Delivery, Monitor vital signs per unit protocol, Routine labs ordered, Encourage frequent position changes and ambulation as tolerated, Clear liquids diet, Continuous fetal monitoring, Pain management per patient request, Continue assessment of maternal and fetal well-being, Recheck as clinically indicted by maternal or fetal status, Dr. Sosa aware of admission and plan of care    SANTOS Torrez    Subjective     Chief Complaint: No chief complaint on file.    Ana is here complaining of q4 min contractions and SROM at 2200p.m. today of clear fluid. Good fetal movement. Denies vaginal bleeding., Having contractions q 4 minutes.         Pregnancy Problems (from 23 to present)       Problem Noted Resolved    40 weeks gestation of pregnancy 2023 by SANTOS Torrez No    Priority:  Medium               Obstetrical History   OB History    Para Term  AB Living   1             SAB IAB Ectopic Multiple Live Births                  # Outcome Date GA Lbr Brody/2nd Weight Sex Delivery Anes PTL Lv   1 Current                Past Medical History  No past medical history on file.     Past Surgical History   Past Surgical History:   Procedure Laterality Date    WISDOM TOOTH EXTRACTION         Social History  Social  History     Tobacco Use    Smoking status: Never    Smokeless tobacco: Never   Substance Use Topics    Alcohol use: Not Currently     Substance and Sexual Activity   Drug Use Never       Allergies  Patient has no known allergies.     Medications  Medications Prior to Admission   Medication Sig Dispense Refill Last Dose    PNV no.163-iron-folate no.10 20 mg iron- 1 mg tablet Take by mouth.       pyridoxine (Vitamin B-6) 25 mg tablet Take 1 tablet (25 mg) by mouth 3 times a day.          Objective    Last Vitals  Temp Pulse Resp BP MAP O2 Sat                   Physical Examination    GENERAL: Examination reveals a well developed, well nourished, gravid female in no acute distress and whose affect is appropriate. She is alert and cooperative.  LUNGS:  unlabored breathing  HEART:  regular HR  ABDOMEN: soft, gravid, nontender, nondistended, no abnormal masses, no epigastric pain  FHR is  , with  , and a   tracing.    [place] reading:    The fetus is in a vertex presentation, determined by vaginal exam and confirmed by BSUS  Current Estimated Fetal Weight 7.5 lbs established by Leopold's maneuver  VAGINA: normal appearing vagina with normal color and discharge and no lesions noted  CERVIX:  cervix 4 cm dilated, 90 % effaced, -1 station, soft  in consistency,  anterior in position.; MEMBRANES are  SROM at 2200 clear fluid; +pooling, +nitrazine  EXTREMITIES: no redness or tenderness in the calves or thighs, no edema  SKIN: normal coloration and turgor, no rashes  NEUROLOGICAL: alert, oriented, normal speech, no focal findings or movement disorder noted  PSYCHOLOGICAL: awake and alert; oriented to person, place, and time    Fetal Monitoring      Baseline FHR: 130 per minute  Variability: moderate  Accelerations: yes  Decelerations: none  TOCO: regular, every 4 minutes    Cervical Exam:  4 cm dilated, 90 effaced, -1 station, cervical position anterior, consistency soft, presenting part vertex    Membrane status: ruptured,  grossly ruptured; clear fluid noted      Lab Review  Labs in chart were reviewed.

## 2023-12-14 NOTE — ANESTHESIA PROCEDURE NOTES
Epidural Block    Patient location during procedure: OB  Start time: 12/14/2023 1:41 AM  End time: 12/14/2023 2:19 AM  Reason for block: labor analgesia  Staffing  Performed: CRNA and SRNA   Authorized by: JACKELINE Yeung    Performed by: JACKELINE Yeung    Preanesthetic Checklist  Completed: patient identified, IV checked, risks and benefits discussed, surgical consent, pre-op evaluation, timeout performed and sterile techniques followed  Block Timeout  RN/Licensed healthcare professional reads aloud to the Anesthesia provider and entire team: Patient identity, procedure with side and site, patient position, and as applicable the availability of implants/special equipment/special requirements.  Patient on coagulant treatment: no  Timeout performed at: 12/14/2023 1:41 AM  Block Placement  Patient position: sitting  Prep: ChloraPrep  Sterility prep: drape, gown, mask, gloves, cap and hand  Sedation level: no sedation  Patient monitoring: blood pressure, continuous pulse oximetry and heart rate  Approach: midline  Local numbing: lidocaine 1% to skin and subcutaneous tissues  Vertebral space: lumbar  Lumbar location: L3-L4  Epidural  Loss of resistance technique: saline  Guidance: landmark technique        Needle  Needle type: Tuohy   Needle gauge: 17  Needle length: 10.2 cm  Needle insertion depth: 6.5 cm  Catheter type: multi-orifice  Catheter size: 19 G  Catheter at skin depth: 11.5 cm  Catheter securement method: clear occlusive dressing and liquid medical adhesive    Test dose: lidocaine 1.5% with epinephrine 1-to-200,000  Test dose given at 12/14/2023 1:55 AM  Test dose: lidocaine 1.5% with epinephrine 1-to-200,000  Test dose result: no positive test dose    PCEA  Medication concentration used: 0.2% Ropivacaine with 2 mcg/mL Fentanyl  Dose (mL): 5  Lockout (minutes): 30  1-Hour Limit (boluses/hr): 25  Basal Rate: 10        Assessment  Sensory level: T9 on right, T6 on  left to pinprick. Pt is positioned right tilt and sitting up in bed.  Block outcome: patient comfortable  Number of attempts: 1  Events: no positive test dose  Procedure assessment: patient tolerated procedure well with no immediate complications  Additional Notes  Pt denies questions about the risks of the epidural placement.  Epidural placed by DARWIN Robison with no difficulty.  0158: 2 ml of 1.5 % Lidocaine with 1:200K Epi given via epidural.   Pt in [facility] after epidural placement.

## 2023-12-14 NOTE — PROGRESS NOTES
S: Pt comfortable with epidural in place     O: FHR: 130's with moderate variability, +acccelerations, occasional variable decelerations  Reassuring CAT II FHR tracing   Contractions every 2-4 min   SVE: 6/100/0  SROM at 2200; clear fluid noted     A: IUP at 40.2      Active labor       GBS neg       Reassuring CAT II fhr tracing       P: Dr. Sosa updated on patient status      Frequent position changes; will move to lateral position with peanut ball      CEFM      Anticipate     SANTOS Roper

## 2023-12-15 LAB — FETAL MATERNAL SCREEN: NORMAL

## 2023-12-15 PROCEDURE — 2500000004 HC RX 250 GENERAL PHARMACY W/ HCPCS (ALT 636 FOR OP/ED): Performed by: NURSE PRACTITIONER

## 2023-12-15 PROCEDURE — 36415 COLL VENOUS BLD VENIPUNCTURE: CPT | Performed by: NURSE PRACTITIONER

## 2023-12-15 PROCEDURE — 96372 THER/PROPH/DIAG INJ SC/IM: CPT | Performed by: NURSE PRACTITIONER

## 2023-12-15 PROCEDURE — 94760 N-INVAS EAR/PLS OXIMETRY 1: CPT

## 2023-12-15 PROCEDURE — 85461 HEMOGLOBIN FETAL: CPT

## 2023-12-15 PROCEDURE — 1100000001 HC PRIVATE ROOM DAILY

## 2023-12-15 PROCEDURE — 2500000001 HC RX 250 WO HCPCS SELF ADMINISTERED DRUGS (ALT 637 FOR MEDICARE OP): Performed by: ADVANCED PRACTICE MIDWIFE

## 2023-12-15 RX ADMIN — IBUPROFEN 600 MG: 600 TABLET ORAL at 13:21

## 2023-12-15 RX ADMIN — IBUPROFEN 600 MG: 600 TABLET ORAL at 19:13

## 2023-12-15 RX ADMIN — ACETAMINOPHEN 975 MG: 325 TABLET ORAL at 06:29

## 2023-12-15 RX ADMIN — HUMAN RHO(D) IMMUNE GLOBULIN 300 MCG: 300 INJECTION, SOLUTION INTRAMUSCULAR at 06:30

## 2023-12-15 RX ADMIN — ACETAMINOPHEN 975 MG: 325 TABLET ORAL at 19:13

## 2023-12-15 RX ADMIN — ACETAMINOPHEN 975 MG: 325 TABLET ORAL at 13:21

## 2023-12-15 RX ADMIN — IBUPROFEN 600 MG: 600 TABLET ORAL at 06:29

## 2023-12-15 ASSESSMENT — PAIN SCALES - GENERAL
PAINLEVEL_OUTOF10: 0 - NO PAIN
PAINLEVEL_OUTOF10: 2
PAINLEVEL_OUTOF10: 0 - NO PAIN
PAINLEVEL_OUTOF10: 0 - NO PAIN

## 2023-12-15 NOTE — PROGRESS NOTES
Social Work Brief Note     Patient: Ana Morgan   Name: Sixto Morgan  Rose[place] : 23      Reason for Visit: Support      met with parents bedside. Mrs. Morgan states that she she feeling good following the birth of her first baby a boy named Sixto on 23. Parents said they have all baby supplies at home including a car seat. Reviewed PPD and Safe Sleep. Information provided in folder. SW offered Help Me Grow program but parents decline and feel like they have enough support at home. Parents had no additional questions or concerns.     Signature: WILLIAN Ma

## 2023-12-15 NOTE — ANESTHESIA POSTPROCEDURE EVALUATION
Patient: Ana Morgan    Procedure Summary       Date: 12/14/23 Room / Location:     Anesthesia Start: 0141 Anesthesia Stop: 1522    Procedure: Labor Analgesia Diagnosis:     Scheduled Providers:  Responsible Provider: JACKELINE Phoenix    Anesthesia Type: epidural ASA Status: 2            Anesthesia Type: epidural    Vitals Value Taken Time   BP  12/14/23 2140   Temp  12/14/23 2140   Pulse  12/14/23 2140   Resp  12/14/23 2140   SpO2  12/14/23 2140       Anesthesia Post Evaluation    Patient location during evaluation: bedside  Patient participation: complete - patient participated  Level of consciousness: awake  Pain score: 0  Pain management: adequate  Airway patency: patent  Cardiovascular status: acceptable and stable  Respiratory status: acceptable and room air  Hydration status: acceptable  Postoperative Nausea and Vomiting: none        No notable events documented.

## 2023-12-15 NOTE — CARE PLAN
Problem: Pain  Goal: My pain/discomfort is manageable  Outcome: Progressing     Problem: Safety  Goal: Patient will be injury free during hospitalization  Outcome: Progressing  Goal: I will remain free of falls  Outcome: Progressing     Problem: Daily Care  Goal: Daily care needs are met  Outcome: Progressing   The patient's goals for the shift include breastfeeding improvement     The clinical goals for the shift include free from injury    Over the shift, the patient did make progress toward the following goals. Barriers to progression include n/a. Recommendations to address these barriers include n/a.

## 2023-12-15 NOTE — PROGRESS NOTES
Pt now meets criteria for gHTN PP.   Notified PP nurse that her vs need adjusted to every 4 hours.     Yadira Suarez, APRN-MARINAM, APRN-CNP

## 2023-12-15 NOTE — PROGRESS NOTES
Postpartum Progress Note    Assessment/Plan   Ana Morgan is a 23 y.o., , who delivered at 40w2d gestation and is now postpartum day 1.    Mild range BP PP. Asymptomatic. Labs normal  Will continue to monitor BP  Does not require antihypertensives at this time  Anticipate discharge home tomorrow  All questions answered    Principal Problem:    Normal labor  Active Problems:    40 weeks gestation of pregnancy    Gestational hypertension, antepartum    Pregnancy Problems (from 23 to present)       Problem Noted Resolved    Gestational hypertension, antepartum 2023 by Yadira Suarez, APRN-CNM, APRN-CNP No    Priority:  Medium      40 weeks gestation of pregnancy 2023 by GABE Torrez-LEÓN No    Priority:  Medium            Hospital course: gestational hypertension  Vaginal Birth  Patient is currently breastfeedingThe patient's blood type is O NEG. The baby's blood type is O POS . Rhogam indicated and given.    Subjective   Her pain is well controlled with current medications  She is passing flatus  She is ambulating well  She is tolerating a Adult diet Regular  She reports no breast or nursing problems  She denies emotional concerns today   Her plan for contraception is - will discuss with partner         Objective   Allergies:   Patient has no known allergies.         Last Vitals:  Temp Pulse Resp BP MAP Pulse Ox   (!) 35.9 °C (96.6 °F) 82 18 129/85   100 %     Vitals Min/Max Last 24 Hours:  Temp  Min: 35.9 °C (96.6 °F)  Max: 37.7 °C (99.9 °F)  Pulse  Min: 81  Max: 153  Resp  Min: 17  Max: 20  BP  Min: 103/49  Max: 151/81    Intake/Output:     Intake/Output Summary (Last 24 hours) at 12/15/2023 1134  Last data filed at 2023 1700  Gross per 24 hour   Intake --   Output 317 ml   Net -317 ml       Physical Exam:  General: Examination reveals a well developed, well nourished, female, in no acute distress. She is alert and cooperative.  Abdomen: soft, NT.  Fundus: firm, below  umbilicus, and nontender.  Extremities: no redness or tenderness in the calves or thighs, no edema.    Lab Data:  Labs in chart were reviewed.

## 2023-12-16 VITALS
TEMPERATURE: 97.7 F | SYSTOLIC BLOOD PRESSURE: 129 MMHG | DIASTOLIC BLOOD PRESSURE: 84 MMHG | HEART RATE: 78 BPM | OXYGEN SATURATION: 98 % | RESPIRATION RATE: 18 BRPM

## 2023-12-16 PROBLEM — Z3A.40 40 WEEKS GESTATION OF PREGNANCY (HHS-HCC): Status: RESOLVED | Noted: 2023-12-13 | Resolved: 2023-12-16

## 2023-12-16 PROBLEM — Z37.9 NORMAL LABOR (HHS-HCC): Status: RESOLVED | Noted: 2023-12-13 | Resolved: 2023-12-16

## 2023-12-16 PROCEDURE — 99238 HOSP IP/OBS DSCHRG MGMT 30/<: CPT | Performed by: OBSTETRICS & GYNECOLOGY

## 2023-12-16 PROCEDURE — 2500000001 HC RX 250 WO HCPCS SELF ADMINISTERED DRUGS (ALT 637 FOR MEDICARE OP): Performed by: ADVANCED PRACTICE MIDWIFE

## 2023-12-16 RX ORDER — NEBULIZER AND COMPRESSOR
1 EACH MISCELLANEOUS 2 TIMES DAILY
Qty: 1 EACH | Refills: 0 | Status: SHIPPED | OUTPATIENT
Start: 2023-12-16 | End: 2023-12-28 | Stop reason: SDUPTHER

## 2023-12-16 RX ORDER — IBUPROFEN 600 MG/1
600 TABLET ORAL EVERY 6 HOURS SCHEDULED
Qty: 30 TABLET | Refills: 2 | Status: SHIPPED | OUTPATIENT
Start: 2023-12-16 | End: 2023-12-16 | Stop reason: SDUPTHER

## 2023-12-16 RX ORDER — IBUPROFEN 600 MG/1
600 TABLET ORAL EVERY 6 HOURS SCHEDULED
Qty: 30 TABLET | Refills: 2 | Status: SHIPPED | OUTPATIENT
Start: 2023-12-16

## 2023-12-16 RX ORDER — NEBULIZER AND COMPRESSOR
1 EACH MISCELLANEOUS 2 TIMES DAILY
Qty: 1 EACH | Refills: 0 | Status: SHIPPED | OUTPATIENT
Start: 2023-12-16 | End: 2023-12-16 | Stop reason: SDUPTHER

## 2023-12-16 RX ADMIN — ACETAMINOPHEN 975 MG: 325 TABLET ORAL at 07:21

## 2023-12-16 RX ADMIN — IBUPROFEN 600 MG: 600 TABLET ORAL at 07:21

## 2023-12-16 RX ADMIN — ACETAMINOPHEN 975 MG: 325 TABLET ORAL at 00:19

## 2023-12-16 RX ADMIN — IBUPROFEN 600 MG: 600 TABLET ORAL at 00:19

## 2023-12-16 ASSESSMENT — PAIN SCALES - GENERAL
PAINLEVEL_OUTOF10: 0 - NO PAIN

## 2023-12-16 ASSESSMENT — PAIN - FUNCTIONAL ASSESSMENT: PAIN_FUNCTIONAL_ASSESSMENT: 0-10

## 2023-12-16 NOTE — CARE PLAN
The patient's goals for the shift include Pt  desires discharge    The clinical goals for the shift include free from injury    Vital signs and assessment within normal limits; patient verbalizes understanding of discharge education. Patient to be discharged with a blood pressure cuff. Patient is ready for discharge.  Problem: Pain  Goal: My pain/discomfort is manageable  Outcome: Met     Problem: Safety  Goal: Patient will be injury free during hospitalization  Outcome: Met     Problem: Safety  Goal: I will remain free of falls  Outcome: Met     Problem: Daily Care  Goal: Daily care needs are met  Outcome: Met

## 2023-12-16 NOTE — DISCHARGE SUMMARY
Discharge Summary    Admission Date: 12/13/2023  Discharge Date: 12/16/23    Discharge Diagnosis  Normal labor    Hospital Course  Delivery Date: 12/14/2023  3:22 PM   Delivery type: Vaginal, Spontaneous    GA at delivery: 40w2d  Outcome: Living   Anesthesia during delivery: Epidural   Intrapartum complications: None   Feeding method: Breastfeeding Status: Yes     Procedures: none  Contraception at discharge: none  Review at post partum    Pertinent Physical Exam At Time of Discharge  Abdomen soft  Fundus firm  Extremities nontender          Discharge Meds     Your medication list        ASK your doctor about these medications        Instructions Last Dose Given Next Dose Due   PNV no.163-iron-folate no.10 20 mg iron- 1 mg tablet           pyridoxine 25 mg tablet  Commonly known as: Vitamin B-6                     Complications Requiring Follow-Up  Gestational hypertension 1 week follow-up    Test Results Pending At Discharge  Pending Labs       No current pending labs.            Outpatient Follow-Up  Future Appointments   Date Time Provider Department Center   12/19/2023  9:00 AM Yadira Suarez, APRN-CNM, APRN-CNP [id number] spent 30 minutes in the professional and overall care of this patient.      Brendan Ward MD

## 2023-12-19 ENCOUNTER — APPOINTMENT (OUTPATIENT)
Dept: OBSTETRICS AND GYNECOLOGY | Facility: CLINIC | Age: 23
End: 2023-12-19
Payer: COMMERCIAL

## 2023-12-28 ENCOUNTER — POSTPARTUM VISIT (OUTPATIENT)
Dept: OBSTETRICS AND GYNECOLOGY | Facility: CLINIC | Age: 23
End: 2023-12-28
Payer: COMMERCIAL

## 2023-12-28 VITALS
SYSTOLIC BLOOD PRESSURE: 146 MMHG | DIASTOLIC BLOOD PRESSURE: 96 MMHG | HEIGHT: 63 IN | WEIGHT: 169 LBS | BODY MASS INDEX: 29.95 KG/M2

## 2023-12-28 DIAGNOSIS — Z87.59 HISTORY OF GESTATIONAL HYPERTENSION: ICD-10-CM

## 2023-12-28 PROCEDURE — 0503F POSTPARTUM CARE VISIT: CPT | Performed by: MIDWIFE

## 2023-12-28 RX ORDER — NEBULIZER AND COMPRESSOR
1 EACH MISCELLANEOUS 2 TIMES DAILY
Qty: 1 EACH | Refills: 0 | Status: SHIPPED | OUTPATIENT
Start: 2023-12-28

## 2023-12-28 ASSESSMENT — ENCOUNTER SYMPTOMS
PSYCHIATRIC NEGATIVE: 1
EYES NEGATIVE: 1
HEMATOLOGIC/LYMPHATIC NEGATIVE: 1
ALLERGIC/IMMUNOLOGIC NEGATIVE: 1
RESPIRATORY NEGATIVE: 1
CARDIOVASCULAR NEGATIVE: 1
CONSTITUTIONAL NEGATIVE: 1
GASTROINTESTINAL NEGATIVE: 1
ENDOCRINE NEGATIVE: 1
MUSCULOSKELETAL NEGATIVE: 1
NEUROLOGICAL NEGATIVE: 1

## 2023-12-28 ASSESSMENT — EDINBURGH POSTNATAL DEPRESSION SCALE (EPDS)
I HAVE BEEN SO UNHAPPY THAT I HAVE BEEN CRYING: NO, NEVER
I HAVE BEEN ANXIOUS OR WORRIED FOR NO GOOD REASON: NO, NOT AT ALL
THINGS HAVE BEEN GETTING ON TOP OF ME: NO, I HAVE BEEN COPING AS WELL AS EVER
TOTAL SCORE: 0
I HAVE FELT SAD OR MISERABLE: NO, NOT AT ALL
I HAVE BEEN SO UNHAPPY THAT I HAVE HAD DIFFICULTY SLEEPING: NOT AT ALL
I HAVE FELT SCARED OR PANICKY FOR NO GOOD REASON: NO, NOT AT ALL
I HAVE LOOKED FORWARD WITH ENJOYMENT TO THINGS: AS MUCH AS I EVER DID
THE THOUGHT OF HARMING MYSELF HAS OCCURRED TO ME: NEVER
I HAVE BEEN ABLE TO LAUGH AND SEE THE FUNNY SIDE OF THINGS: AS MUCH AS I ALWAYS COULD
I HAVE BLAMED MYSELF UNNECESSARILY WHEN THINGS WENT WRONG: NO, NEVER

## 2023-12-28 NOTE — PROGRESS NOTES
Subjective   Patient ID: Ana Morgan is a 23 y.o. female who presents for Postpartum Follow-up (BP Check).  This 22yo presents at 2 weeks postpartum following  over a perineal laceration and repair with pregnancy and delivery course c/b gestational hypertension. Today she notes that she had trouble getting a BP cuff and has not been monitoring pressures at home. She denies symptoms including HA, vision changes, SOB, chest or RUQ pain, or edema. She is voiding and defecating without difficulty and managing perineal pain as needed, she notes minimal bleeding. She endorses effective coping, positive bonding, and help at home as her  is taking a leave from work for 6 weeks. She denies concerns for developing depression. She is breast feeding without difficulty and notes that the baby is gaining weight appropriately. She denies concerns and is not currently interested in contraception, but will consider.     We reviewed the importance of monitoring BP, concerning and reportable values and symptoms, how to report/contacting on call service as needed, and how to obtain a BP cuff. She verbalized understanding and we will help her follow up on obtaining a cuff today. She had no other questions or concerns. She would like to be seen next week for a BP check.     Review of Systems   Constitutional: Negative.    HENT: Negative.     Eyes: Negative.    Respiratory: Negative.     Cardiovascular: Negative.    Gastrointestinal: Negative.    Endocrine: Negative.    Genitourinary: Negative.    Musculoskeletal: Negative.    Skin: Negative.    Allergic/Immunologic: Negative.    Neurological: Negative.    Hematological: Negative.    Psychiatric/Behavioral: Negative.         Objective   Physical Exam  Vitals and nursing note reviewed.   Constitutional:       Appearance: Normal appearance.   HENT:      Head: Normocephalic and atraumatic.      Right Ear: Tympanic membrane, ear canal and external ear normal.      Left Ear:  Tympanic membrane, ear canal and external ear normal.      Nose: Nose normal.      Mouth/Throat:      Mouth: Mucous membranes are moist.      Pharynx: Oropharynx is clear.   Eyes:      Extraocular Movements: Extraocular movements intact.      Conjunctiva/sclera: Conjunctivae normal.      Pupils: Pupils are equal, round, and reactive to light.   Pulmonary:      Effort: Pulmonary effort is normal.   Musculoskeletal:         General: Normal range of motion.      Cervical back: Normal range of motion.   Skin:     General: Skin is warm and dry.   Neurological:      General: No focal deficit present.      Mental Status: She is alert and oriented to person, place, and time. Mental status is at baseline.   Psychiatric:         Mood and Affect: Mood normal.         Behavior: Behavior normal.         Thought Content: Thought content normal.         Judgment: Judgment normal.       Assessment/Plan   Diagnoses and all orders for this visit:  Encounter for postpartum visit  History of gestational hypertension  RTO 1 week for BP check  RTO at 6 weeks PP for routine care      SANTOS Gonzales 12/28/23 9:17 AM

## 2024-01-05 ENCOUNTER — OFFICE VISIT (OUTPATIENT)
Dept: OBSTETRICS AND GYNECOLOGY | Facility: CLINIC | Age: 24
End: 2024-01-05
Payer: COMMERCIAL

## 2024-01-05 VITALS
BODY MASS INDEX: 29.06 KG/M2 | DIASTOLIC BLOOD PRESSURE: 86 MMHG | WEIGHT: 164 LBS | HEIGHT: 63 IN | SYSTOLIC BLOOD PRESSURE: 126 MMHG

## 2024-01-05 PROCEDURE — 3079F DIAST BP 80-89 MM HG: CPT | Performed by: MIDWIFE

## 2024-01-05 PROCEDURE — 1036F TOBACCO NON-USER: CPT | Performed by: MIDWIFE

## 2024-01-05 PROCEDURE — 0503F POSTPARTUM CARE VISIT: CPT | Performed by: MIDWIFE

## 2024-01-05 PROCEDURE — 3074F SYST BP LT 130 MM HG: CPT | Performed by: MIDWIFE

## 2024-01-05 ASSESSMENT — EDINBURGH POSTNATAL DEPRESSION SCALE (EPDS)
I HAVE FELT SAD OR MISERABLE: NO, NOT AT ALL
I HAVE LOOKED FORWARD WITH ENJOYMENT TO THINGS: AS MUCH AS I EVER DID
I HAVE BEEN SO UNHAPPY THAT I HAVE HAD DIFFICULTY SLEEPING: NOT AT ALL
I HAVE BEEN ABLE TO LAUGH AND SEE THE FUNNY SIDE OF THINGS: AS MUCH AS I ALWAYS COULD
I HAVE BEEN SO UNHAPPY THAT I HAVE BEEN CRYING: NO, NEVER
THINGS HAVE BEEN GETTING ON TOP OF ME: NO, I HAVE BEEN COPING AS WELL AS EVER
I HAVE FELT SCARED OR PANICKY FOR NO GOOD REASON: NO, NOT AT ALL
I HAVE BEEN ANXIOUS OR WORRIED FOR NO GOOD REASON: NO, NOT AT ALL
TOTAL SCORE: 0
THE THOUGHT OF HARMING MYSELF HAS OCCURRED TO ME: NEVER
I HAVE BLAMED MYSELF UNNECESSARILY WHEN THINGS WENT WRONG: NO, NEVER

## 2024-01-05 ASSESSMENT — ENCOUNTER SYMPTOMS
GASTROINTESTINAL NEGATIVE: 1
ENDOCRINE NEGATIVE: 1
RESPIRATORY NEGATIVE: 1
EYES NEGATIVE: 1
CONSTITUTIONAL NEGATIVE: 1
NEUROLOGICAL NEGATIVE: 1
PSYCHIATRIC NEGATIVE: 1
MUSCULOSKELETAL NEGATIVE: 1
HEMATOLOGIC/LYMPHATIC NEGATIVE: 1
ALLERGIC/IMMUNOLOGIC NEGATIVE: 1
CARDIOVASCULAR NEGATIVE: 1

## 2024-01-05 NOTE — PROGRESS NOTES
Subjective   Patient ID: Ana Morgan is a 23 y.o. female who presents for Postpartum Follow-up (Bp check).  This 22yo presents at 2w PP following  with epidural over perineal laceration and repair. She is feeling well and has no complaints. She is breastfeeding without difficulty. Cramping and perineal pain are minimal; she is voiding and defecating without difficulty. She endorses effective coping and has no concerns for developing depression and endorses help at home. She has been considering contraception methods, she has been on OCP in the past and did not like how she felt and does not think she wants to return to OCP. She has no other questions or concerns.         Review of Systems   Constitutional: Negative.    HENT: Negative.     Eyes: Negative.    Respiratory: Negative.     Cardiovascular: Negative.    Gastrointestinal: Negative.    Endocrine: Negative.    Genitourinary: Negative.    Musculoskeletal: Negative.    Skin: Negative.    Allergic/Immunologic: Negative.    Neurological: Negative.    Hematological: Negative.    Psychiatric/Behavioral: Negative.         Objective   Physical Exam  Constitutional:       Appearance: Normal appearance. She is normal weight.   HENT:      Head: Normocephalic and atraumatic.      Right Ear: Tympanic membrane, ear canal and external ear normal.      Left Ear: Tympanic membrane, ear canal and external ear normal.      Nose: Nose normal.      Mouth/Throat:      Mouth: Mucous membranes are moist.      Pharynx: Oropharynx is clear.   Eyes:      Extraocular Movements: Extraocular movements intact.      Conjunctiva/sclera: Conjunctivae normal.      Pupils: Pupils are equal, round, and reactive to light.   Pulmonary:      Effort: Pulmonary effort is normal.   Musculoskeletal:         General: Normal range of motion.      Cervical back: Normal range of motion and neck supple.   Skin:     General: Skin is warm and dry.   Neurological:      General: No focal deficit  present.      Mental Status: She is alert and oriented to person, place, and time. Mental status is at baseline.   Psychiatric:         Mood and Affect: Mood normal.         Behavior: Behavior normal.         Thought Content: Thought content normal.         Judgment: Judgment normal.     We reviewed postpartum concerning s/s and routine vs urgent follow up. We reviewed contraceptive methods and she will consider at revisit at her next appointment. She had no other questions.     Assessment/Plan   Diagnoses and all orders for this visit:  Encounter for postpartum visit  RTO at 6w PP for routine care or sooner as needed      SANTOS Gonzales 01/05/24 10:33 AM

## 2024-01-25 ENCOUNTER — POSTPARTUM VISIT (OUTPATIENT)
Dept: OBSTETRICS AND GYNECOLOGY | Facility: CLINIC | Age: 24
End: 2024-01-25
Payer: COMMERCIAL

## 2024-01-25 VITALS — WEIGHT: 168.2 LBS | SYSTOLIC BLOOD PRESSURE: 102 MMHG | DIASTOLIC BLOOD PRESSURE: 62 MMHG | BODY MASS INDEX: 29.8 KG/M2

## 2024-01-25 ASSESSMENT — EDINBURGH POSTNATAL DEPRESSION SCALE (EPDS)
I HAVE BEEN ABLE TO LAUGH AND SEE THE FUNNY SIDE OF THINGS: AS MUCH AS I ALWAYS COULD
I HAVE LOOKED FORWARD WITH ENJOYMENT TO THINGS: AS MUCH AS I EVER DID
I HAVE BEEN SO UNHAPPY THAT I HAVE HAD DIFFICULTY SLEEPING: NOT AT ALL
I HAVE BLAMED MYSELF UNNECESSARILY WHEN THINGS WENT WRONG: NO, NEVER
I HAVE BEEN ANXIOUS OR WORRIED FOR NO GOOD REASON: NO, NOT AT ALL
THE THOUGHT OF HARMING MYSELF HAS OCCURRED TO ME: NEVER
I HAVE FELT SAD OR MISERABLE: NO, NOT AT ALL
I HAVE FELT SCARED OR PANICKY FOR NO GOOD REASON: NO, NOT AT ALL
I HAVE BEEN SO UNHAPPY THAT I HAVE BEEN CRYING: NO, NEVER
TOTAL SCORE: 0
THINGS HAVE BEEN GETTING ON TOP OF ME: NO, I HAVE BEEN COPING AS WELL AS EVER

## 2024-01-25 ASSESSMENT — ENCOUNTER SYMPTOMS
GASTROINTESTINAL NEGATIVE: 1
CONSTITUTIONAL NEGATIVE: 1
HEMATOLOGIC/LYMPHATIC NEGATIVE: 1
RESPIRATORY NEGATIVE: 1
CARDIOVASCULAR NEGATIVE: 1
ALLERGIC/IMMUNOLOGIC NEGATIVE: 1
EYES NEGATIVE: 1
ENDOCRINE NEGATIVE: 1
MUSCULOSKELETAL NEGATIVE: 1
NEUROLOGICAL NEGATIVE: 1

## 2024-01-25 NOTE — PROGRESS NOTES
Subjective   Patient ID: Ana Morgan is a 23 y.o. female who presents for Postpartum Follow-up (6 post partum/No concerns noted ).  This pt presents at 6w PP following  over a perineal laceration and repair. She has recovered uneventfully and has no complaints today. She is breastfeeding without difficulty. She is no longer bleeding and denies concerns related to urination or defecation. She denies concerns for depression. She is considering returning for Paraguard IUD insertion.     We reviewed procedural preparation, scheduling, R/B, procedure and recovery of Paraguard insertion and she was given a patient education brochure to review. She will call to schedule PRN.     We discussed transitioning from PP to routine well woman care. She verbalized understanding.     Review of Systems   Constitutional: Negative.    HENT: Negative.     Eyes: Negative.    Respiratory: Negative.     Cardiovascular: Negative.    Gastrointestinal: Negative.    Endocrine: Negative.    Genitourinary: Negative.    Musculoskeletal: Negative.    Skin: Negative.    Allergic/Immunologic: Negative.    Neurological: Negative.    Hematological: Negative.        Objective   Physical Exam  Vitals and nursing note reviewed.   Constitutional:       Appearance: Normal appearance.   HENT:      Head: Normocephalic and atraumatic.      Right Ear: External ear normal.      Left Ear: External ear normal.      Nose: Nose normal.      Mouth/Throat:      Mouth: Mucous membranes are moist.      Pharynx: Oropharynx is clear.   Pulmonary:      Effort: Pulmonary effort is normal.   Abdominal:      Hernia: There is no hernia in the left inguinal area or right inguinal area.   Genitourinary:     General: Normal vulva.      Labia:         Right: No rash, tenderness, lesion or injury.         Left: No rash, tenderness, lesion or injury.       Urethra: No prolapse, urethral pain, urethral swelling or urethral lesion.      Vagina: No signs of injury and  foreign body. No vaginal discharge, erythema, tenderness, bleeding, lesions or prolapsed vaginal walls.      Cervix: Normal.      Uterus: Normal.       Adnexa: Right adnexa normal and left adnexa normal.      Rectum: Normal.   Musculoskeletal:         General: Normal range of motion.      Cervical back: Normal range of motion.   Lymphadenopathy:      Lower Body: No right inguinal adenopathy. No left inguinal adenopathy.   Skin:     General: Skin is warm and dry.   Neurological:      General: No focal deficit present.      Mental Status: She is alert and oriented to person, place, and time. Mental status is at baseline.   Psychiatric:         Mood and Affect: Mood normal.         Behavior: Behavior normal.         Thought Content: Thought content normal.         Judgment: Judgment normal.         Assessment/Plan   Diagnoses and all orders for this visit:  Encounter for postpartum visit - uneventful recovery.   RTO 4-6 months for annual  RTO PRN for IUD insertion        SANTOS Gonzales 01/25/24 10:50 AM

## 2024-03-04 ENCOUNTER — TELEPHONE (OUTPATIENT)
Dept: OBSTETRICS AND GYNECOLOGY | Facility: CLINIC | Age: 24
End: 2024-03-04
Payer: COMMERCIAL

## 2024-03-15 ENCOUNTER — PROCEDURE VISIT (OUTPATIENT)
Dept: OBSTETRICS AND GYNECOLOGY | Facility: CLINIC | Age: 24
End: 2024-03-15
Payer: COMMERCIAL

## 2024-03-15 VITALS
HEIGHT: 63 IN | SYSTOLIC BLOOD PRESSURE: 130 MMHG | BODY MASS INDEX: 29.06 KG/M2 | DIASTOLIC BLOOD PRESSURE: 78 MMHG | WEIGHT: 164 LBS

## 2024-03-15 DIAGNOSIS — Z30.430 ENCOUNTER FOR INSERTION OF COPPER INTRAUTERINE CONTRACEPTIVE DEVICE (IUD): Primary | ICD-10-CM

## 2024-03-15 LAB — PREGNANCY TEST URINE, POC: NEGATIVE

## 2024-03-15 PROCEDURE — 81025 URINE PREGNANCY TEST: CPT | Performed by: MIDWIFE

## 2024-03-15 PROCEDURE — 58300 INSERT INTRAUTERINE DEVICE: CPT | Performed by: MIDWIFE

## 2024-03-15 NOTE — PROGRESS NOTES
Patient ID: Ana Morgan is a 24 y.o. female.    IUD Insertion    Date/Time: 3/15/2024 11:43 AM    Performed by: SANTOS Zarate  Authorized by: SANTOS Zarate    Consent:     Consent obtained:  Verbal and written    Consent given by:  Patient    Procedure risks and benefits discussed: yes      Patient questions answered: yes      Patient agrees, verbalizes understanding, and wants to proceed: yes      Educational handouts given: no      Instructions and paperwork completed: no    Procedure:     Pelvic exam performed: yes      Negative GC/chlamydia test: pt declines.      Negative urine pregnancy test: yes      Cervix cleaned and prepped: yes      Speculum placed in vagina: yes      Tenaculum applied to cervix: yes      Uterus sounded: yes      Uterus sound depth (cm):  8    IUD inserted with no complications: yes      IUD type:  ParaGard    Strings trimmed: yes    Post-procedure:     Patient tolerated procedure well: yes      Patient will follow up after next period: yes    Comments:      Prior to procedure, we reviewed R/B, procedure, recovery, and concerning/reportable s/s. She verbalized understanding and was agreeable to proceed. Negative UPT was confirmed and she declined STD screening. Speculum was placed, cervical os and surrounding areas were cleaned with betadine. The fundus was sounded without difficulty at 8 cm. Attempted to place Paraguard without a tenaculum, though unable to insert applicator device past internal os without it. Tenaculum placed and IUD was eventually placed successfully, but with some difficulty. Strings were trimmed and equipment removed; pt tolerated the procedure without difficulty.     SOL GRAHAM

## 2024-06-27 ENCOUNTER — APPOINTMENT (OUTPATIENT)
Dept: OBSTETRICS AND GYNECOLOGY | Facility: CLINIC | Age: 24
End: 2024-06-27
Payer: COMMERCIAL

## 2024-08-30 ENCOUNTER — APPOINTMENT (OUTPATIENT)
Dept: OBSTETRICS AND GYNECOLOGY | Facility: CLINIC | Age: 24
End: 2024-08-30
Payer: COMMERCIAL

## 2024-08-30 VITALS
DIASTOLIC BLOOD PRESSURE: 86 MMHG | SYSTOLIC BLOOD PRESSURE: 118 MMHG | BODY MASS INDEX: 26.93 KG/M2 | HEIGHT: 63 IN | WEIGHT: 152 LBS

## 2024-08-30 DIAGNOSIS — Z97.5 IUD (INTRAUTERINE DEVICE) IN PLACE: Primary | ICD-10-CM

## 2024-08-30 PROCEDURE — 3008F BODY MASS INDEX DOCD: CPT | Performed by: MIDWIFE

## 2024-08-30 PROCEDURE — 99213 OFFICE O/P EST LOW 20 MIN: CPT | Performed by: MIDWIFE

## 2024-08-30 PROCEDURE — 3074F SYST BP LT 130 MM HG: CPT | Performed by: MIDWIFE

## 2024-08-30 PROCEDURE — 1036F TOBACCO NON-USER: CPT | Performed by: MIDWIFE

## 2024-08-30 PROCEDURE — 3079F DIAST BP 80-89 MM HG: CPT | Performed by: MIDWIFE

## 2024-08-30 ASSESSMENT — ENCOUNTER SYMPTOMS
HEMATOLOGIC/LYMPHATIC NEGATIVE: 1
PSYCHIATRIC NEGATIVE: 1
ENDOCRINE NEGATIVE: 1
CARDIOVASCULAR NEGATIVE: 1
RESPIRATORY NEGATIVE: 1
CONSTITUTIONAL NEGATIVE: 1
GASTROINTESTINAL NEGATIVE: 1
MUSCULOSKELETAL NEGATIVE: 1
ALLERGIC/IMMUNOLOGIC NEGATIVE: 1
NEUROLOGICAL NEGATIVE: 1
EYES NEGATIVE: 1

## 2024-08-30 NOTE — PROGRESS NOTES
Subjective   Patient ID: Ana Morgan is a 24 y.o. female who presents for Follow-up (IUD check, no complaints).  This 23yo presents for an IUD string check. She had a Paraguard IUD placed in March 2024 and was not able to return for string check until now. She is overall satisfied and has no complaints today.         Review of Systems   Constitutional: Negative.    HENT: Negative.     Eyes: Negative.    Respiratory: Negative.     Cardiovascular: Negative.    Gastrointestinal: Negative.    Endocrine: Negative.    Genitourinary: Negative.    Musculoskeletal: Negative.    Skin: Negative.    Allergic/Immunologic: Negative.    Neurological: Negative.    Hematological: Negative.    Psychiatric/Behavioral: Negative.         Objective   Physical Exam  Vitals and nursing note reviewed.   Constitutional:       Appearance: Normal appearance.   HENT:      Head: Normocephalic and atraumatic.      Right Ear: External ear normal.      Left Ear: External ear normal.      Nose: Nose normal.      Mouth/Throat:      Mouth: Mucous membranes are moist.      Pharynx: Oropharynx is clear.   Eyes:      Extraocular Movements: Extraocular movements intact.      Conjunctiva/sclera: Conjunctivae normal.      Pupils: Pupils are equal, round, and reactive to light.   Pulmonary:      Effort: Pulmonary effort is normal.   Abdominal:      Hernia: There is no hernia in the left inguinal area or right inguinal area.   Genitourinary:     General: Normal vulva.      Labia:         Right: No rash, tenderness, lesion or injury.         Left: No rash, tenderness, lesion or injury.       Urethra: No prolapse, urethral pain, urethral swelling or urethral lesion.      Vagina: Normal.      Cervix: Normal.      Uterus: Normal.       Adnexa: Right adnexa normal and left adnexa normal.      Rectum: Normal.      Comments: IUD strings visible   Musculoskeletal:         General: Normal range of motion.   Lymphadenopathy:      Lower Body: No right inguinal  adenopathy. No left inguinal adenopathy.   Skin:     General: Skin is warm and dry.   Neurological:      General: No focal deficit present.      Mental Status: She is alert and oriented to person, place, and time. Mental status is at baseline.   Psychiatric:         Mood and Affect: Mood normal.         Behavior: Behavior normal.         Thought Content: Thought content normal.         Judgment: Judgment normal.         Assessment/Plan   Diagnoses and all orders for this visit:  IUD (intrauterine device) in place  RTO 5/2025 for annual        GABE Zarate-LEÓN 08/30/24 9:15 AM